# Patient Record
Sex: MALE | Race: WHITE | Employment: OTHER | URBAN - NONMETROPOLITAN AREA
[De-identification: names, ages, dates, MRNs, and addresses within clinical notes are randomized per-mention and may not be internally consistent; named-entity substitution may affect disease eponyms.]

---

## 2017-12-19 ENCOUNTER — OFFICE VISIT (OUTPATIENT)
Dept: INTERNAL MEDICINE CLINIC | Age: 67
End: 2017-12-19
Payer: MEDICARE

## 2017-12-19 VITALS
HEIGHT: 72 IN | SYSTOLIC BLOOD PRESSURE: 136 MMHG | DIASTOLIC BLOOD PRESSURE: 70 MMHG | BODY MASS INDEX: 31.97 KG/M2 | HEART RATE: 60 BPM | WEIGHT: 236 LBS

## 2017-12-19 DIAGNOSIS — Z01.818 PREOP EXAMINATION: Primary | ICD-10-CM

## 2017-12-19 DIAGNOSIS — I10 ESSENTIAL HYPERTENSION: ICD-10-CM

## 2017-12-19 DIAGNOSIS — D69.6 THROMBOCYTOPENIA (HCC): ICD-10-CM

## 2017-12-19 DIAGNOSIS — E78.5 HYPERLIPIDEMIA, UNSPECIFIED HYPERLIPIDEMIA TYPE: ICD-10-CM

## 2017-12-19 PROCEDURE — 1036F TOBACCO NON-USER: CPT | Performed by: INTERNAL MEDICINE

## 2017-12-19 PROCEDURE — 99214 OFFICE O/P EST MOD 30 MIN: CPT | Performed by: INTERNAL MEDICINE

## 2017-12-19 PROCEDURE — 4040F PNEUMOC VAC/ADMIN/RCVD: CPT | Performed by: INTERNAL MEDICINE

## 2017-12-19 PROCEDURE — G8417 CALC BMI ABV UP PARAM F/U: HCPCS | Performed by: INTERNAL MEDICINE

## 2017-12-19 PROCEDURE — 3017F COLORECTAL CA SCREEN DOC REV: CPT | Performed by: INTERNAL MEDICINE

## 2017-12-19 PROCEDURE — G8427 DOCREV CUR MEDS BY ELIG CLIN: HCPCS | Performed by: INTERNAL MEDICINE

## 2017-12-19 PROCEDURE — G8484 FLU IMMUNIZE NO ADMIN: HCPCS | Performed by: INTERNAL MEDICINE

## 2017-12-19 PROCEDURE — 1123F ACP DISCUSS/DSCN MKR DOCD: CPT | Performed by: INTERNAL MEDICINE

## 2017-12-19 NOTE — PROGRESS NOTES
[facility]  PHYSICIANS [place] [address], [address]  Dept: [phone]  Dept Fax: [phone]      Chief Complaint   Patient presents with    Pre-op Exam     This patient was referred to me by Dr. Shaquille Phillips for pre-op evaluation prior to right THR which is scheduled for 1/18 at [facility]. Patient has had problems with the hip for 8 years  It is causing severe pain and problems with mobility and ambulation  Patient has failed conservative management. Please see documentation per orthopaedics  I saw him November 2016 prior to left hip replacement. At that time he had an abnormal EKG  Dr. Ani Fairbanks did a stress test that was normal.  Patient says nothing has changed since I saw him last  Past Medical History:   Diagnosis Date    Hyperlipidemia     Hypertension        Past Surgical History:   Procedure Laterality Date    ACHILLES TENDON SURGERY Right 1985   [address]  [id number]   [place]    Lumpectomy    TOTAL HIP ARTHROPLASTY Right 2016       Social History     Social History    Marital status: Single     Spouse name: N/A    Number of children: N/A    Years of education: N/A     Occupational History    Not on file. Social History Main Topics    Smoking status: Former Smoker     Start date: 12/11/2017    Smokeless tobacco: Former User    Alcohol use Yes    Drug use: No    Sexual activity: Not on file     Other Topics Concern    Not on file     Social History Narrative    No narrative on file       Current Outpatient Prescriptions   Medication Sig Dispense Refill    CALCIUM 500/D 500-400 MG-UNIT CHEW Take 1 tablet by mouth 2 times daily      nitroGLYCERIN (NITROSTAT) 0.4 MG SL tablet Place 0.4 mg under the tongue every 5 minutes as needed for Chest pain Dissolve 1 tablet under tongue for chest pain and repeat every 5 min up to max of 3 total doses.   If no relief after 3 doses call 911      amLODIPine-atorvastatatin (CADUET) 5-40 MG per tablet Take 1 tablet by mouth daily      losartan (COZAAR) 100 MG tablet Take 100 mg by mouth daily      metoprolol succinate (TOPROL XL) 25 MG extended release tablet Take 25 mg by mouth daily       No current facility-administered medications for this visit. Family History   Problem Relation Age of Onset    Arthritis Mother     Cancer Father     Arthritis Father      No Known Allergies  There is no family history of bleeding disorders  Review of Systems - General ROS: negative  Psychological ROS: negative  Hematological and Lymphatic ROS: No history of blood clots or bleeding disorder. Respiratory ROS: no cough, shortness of breath, or wheezing  Cardiovascular ROS: no chest pain or dyspnea on exertion  Gastrointestinal ROS: no abdominal pain, change in bowel habits, or black or bloody stools  Genito-Urinary ROS: no dysuria, trouble voiding, or hematuria  Musculoskeletal ROS: positive for - pain in right hip  Neurological ROS: no TIA or stroke symptoms  Dermatological ROS: negative    This patient has never experienced difficulty with general anesthetic. SLEEP APNEA SCREENING:  Do you snore? yes  Ever been told you stop breathing during sleep? no  Daytime somnolence? no  Neck size n/a inches  BMI 32.01  Blood pressure 136/70, pulse 60, height 6' (1.829 m), weight 236 lb (107 kg). Body mass index is 32.01 kg/m². Physical Examination: General appearance - alert, well appearing, and in no distress  Mental status - alert, oriented to person, place, and time  Neck - Neck is supple, no JVD or carotid bruits. No thyromegaly or adenopathy.    Chest - clear to auscultation, no wheezes, rales or rhonchi, symmetric air entry  Heart - normal rate, regular rhythm, normal S1, S2, no murmurs, rubs, clicks or gallops  Abdomen - soft, nontender, nondistended, no masses or organomegaly  Neurological - alert, oriented, normal speech, no focal findings or movement disorder noted  Musculoskeletal - no joint tenderness, deformity or swelling  Extremities - peripheral pulses normal, no pedal edema, no clubbing or cyanosis  Skin - normal coloration and turgor, no rashes, no suspicious skin lesions noted    I have reviewed recent diagnostic testing including EKG, CXR, labs    1. Preop examination     2. Essential hypertension     3. Hyperlipidemia, unspecified hyperlipidemia type     4. Thrombocytopenia ([facility] [address]                 Patient has no h/o MI or CHF  Patient is active to > 4 mets without any cardiac symptoms. Planned surgery is  intermediate risk. Recent EKG showed sinus bradycardia. Chest x-ray nothing acute. Labs essentially normal aside from borderline thrombocytopenia 143,000     No further testing needed. There are no contraindications to proceed with surgery. Acceptable risk from a cardiopulmonary/medical standpoint. Will notify referring surgeon. Recommend routine DVT/PE prophylaxis as per surgery.

## 2017-12-29 ENCOUNTER — HOSPITAL ENCOUNTER (OUTPATIENT)
Dept: RADIATION ONCOLOGY | Age: 67
Discharge: HOME | End: 2017-12-29
Payer: MEDICARE

## 2017-12-29 PROCEDURE — 99202 OFFICE O/P NEW SF 15 MIN: CPT | Performed by: RADIOLOGY

## 2018-01-08 ENCOUNTER — HOSPITAL ENCOUNTER (OUTPATIENT)
Dept: RADIATION ONCOLOGY | Age: 68
Discharge: HOME | End: 2018-01-08
Payer: MEDICARE

## 2018-01-08 ENCOUNTER — HOSPITAL ENCOUNTER (OUTPATIENT)
Age: 68
Discharge: HOME | End: 2018-01-08
Payer: MEDICARE

## 2018-01-08 LAB
ABO: NORMAL
ANTIBODY SCREEN: NORMAL
RH FACTOR: NORMAL

## 2018-01-08 PROCEDURE — 86900 BLOOD TYPING SEROLOGIC ABO: CPT

## 2018-01-08 PROCEDURE — 86901 BLOOD TYPING SEROLOGIC RH(D): CPT

## 2018-01-08 PROCEDURE — 86850 RBC ANTIBODY SCREEN: CPT

## 2018-01-08 PROCEDURE — 36415 COLL VENOUS BLD VENIPUNCTURE: CPT

## 2018-01-09 ENCOUNTER — HOSPITAL ENCOUNTER (OUTPATIENT)
Dept: CT IMAGING | Age: 68
Discharge: HOME | End: 2018-01-09
Payer: MEDICARE

## 2018-01-09 DIAGNOSIS — M89.8X9 HETEROTOPIC OSSIFICATION OF BONE: ICD-10-CM

## 2018-01-09 PROCEDURE — 77412 RADIATION TX DELIVERY LVL 3: CPT | Performed by: RADIOLOGY

## 2018-01-09 PROCEDURE — 77290 THER RAD SIMULAJ FIELD CPLX: CPT | Performed by: RADIOLOGY

## 2018-01-09 PROCEDURE — 77334 RADIATION TREATMENT AID(S): CPT | Performed by: RADIOLOGY

## 2018-01-09 PROCEDURE — 3209999900 CT GUIDE RADIATION THERAPY NO CHARGE

## 2018-01-09 PROCEDURE — 77280 THER RAD SIMULAJ FIELD SMPL: CPT | Performed by: RADIOLOGY

## 2018-01-09 PROCEDURE — 77307 TELETHX ISODOSE PLAN CPLX: CPT | Performed by: RADIOLOGY

## 2018-01-09 NOTE — PROGRESS NOTES
Radiation/ Oncology Consultation  Encounter Date: 2017     Mr. Bharati Emerson is a 75 y.o. male  : 1950  MRN: [id number]  [facility]  Requesting Provider: Dr. Tammy Hidalgo    Reason for request: Heterotopic Ossification      CONSULTANT: Rosemary Peralta       CHIEF COMPLAINT: M61.40 -- Post-operative Heterotopic Ossification       HPI: Bharati Emerson is a 28-year-old gentleman who has severe degenerative joint disease, particularly involving the hips. He underwent left total hip arthroplasty in 2016, and developed heterotopic ossification after the surgery. He maintains fairly good range of motion of the left hip, but given the history he is at very high risk of developing heterotopic ossification again if he has additional surgeries. He is now scheduled to undergo right total hip arthroplasty. He is being seen today for evaluation and discussion regarding the role of radiation therapy and significantly decreasing his risk of heterotopic ossification after his upcoming surgery. .         Past Medical History:   Diagnosis Date    Hyperlipidemia     Hypertension         Past Surgical History:   Procedure Laterality Date    ACHILLES TENDON SURGERY Right     CARDIAC CATHETERIZATION  [id number]    NECK SURGERY      Lumpectomy    TOTAL HIP ARTHROPLASTY Right 2016   CORRECTION: LEFT TOTAL HIP ARTHROPLASTY, 2016    Family History   Problem Relation Age of Onset    Arthritis Mother     Cancer Father     Arthritis Father    Additional family medical conditions include hypertension and heart disease. Social History     Social History    Marital status:      Spouse name: N/A    Number of children: N/A    Years of education: N/A     Occupational History    Not on file.      Social History Main Topics    Smoking status: Former Smoker     Start date: 2017    Smokeless tobacco: Former User    Alcohol use Yes    Drug use: No    Sexual activity: Not on heterotopic bone prevention. Clinical data show a success rate of approximately 96%. Data also showed that delivering a single fraction preoperatively has a success rate similar to the previous standard postoperative therapy. Preoperative treatment is much better tolerated, and is now considered the preferred method. 3. The diagnosis and treatment recommendations were discussed at length with the patient. We then discussed the nature/mode of action of external beam radiation therapy, steps involved and set up and initiation of treatment, and expected plus potential short and long-term side effects and risks of the treatment. Given the low total dose, there are no acute side effects from the treatment and data show extremely low risk of long-term side effects when treatment is designed and deliver properly. Mr. Latia Echavarria had the opportunity to ask questions, and indicated his questions were satisfactorily addressed. He also indicated that he understood the discussion, and wishes to proceed with the recommended treatment. PLAN:  1. Surgery is planned for 1/10/2018. The current least scheduled procedure requires that the patient reports 20IO early in the morning. Therefore, we will schedule simulation and treatment for the day preceding surgery. Treatment will be in the afternoon to have the shortest interval possible between the treatment and the surgery (well within the 24 hour window). Thank you for allowing my assistance in the care of this very nice, and interesting gentleman. Electronically signed by Alena Garcia MD on 2018 at 9:36 AM       cc:  Dr. Luisana Haque; Dr. Po Patel: 70 minutes spent actively reviewing patient data; 50 minutes face-to-face time,  >50% spent in counseling/discussion/education. This document was created using a voice-recognition program.  Computer generated transcription errors may be present.

## 2018-01-25 ENCOUNTER — HOSPITAL ENCOUNTER (OUTPATIENT)
Dept: INTERVENTIONAL RADIOLOGY/VASCULAR | Age: 68
Discharge: HOME | End: 2018-01-25
Payer: MEDICARE

## 2018-01-25 DIAGNOSIS — R60.9 SWELLING: ICD-10-CM

## 2018-01-25 DIAGNOSIS — R52 PAIN: ICD-10-CM

## 2018-01-25 PROCEDURE — 93971 EXTREMITY STUDY: CPT

## 2018-02-23 ENCOUNTER — HOSPITAL ENCOUNTER (OUTPATIENT)
Dept: RADIATION ONCOLOGY | Age: 68
Discharge: HOME | End: 2018-02-23
Payer: MEDICARE

## 2018-02-23 PROCEDURE — 99211 OFF/OP EST MAY X REQ PHY/QHP: CPT

## 2018-02-23 NOTE — PROGRESS NOTES
[facility], [address]  Phone: [phone]   Toll Free: [phone]   Fax: [phone]    RADIATION ONCOLOGY FOLLOW UP REPORT    PATIENT NAME:  Mercedes Mcpherson     : 1950  MEDICAL RECORD NO: [id number]    LOCATION: [facility] NO: [id number]      PROVIDER: Joce Cardona CNP        DATE OF SERVICE: 2018    FOLLOW UP PHYSICIANS: Dr. Benji Gaytan    DIAGNOSIS: M61.40 -- Post-operative Heterotopic Ossification     DATE OF DIAGNOSIS:  with left hip replacement    END OF TREATMENT DATE: 2018    ECOG PERFORMANCE STATUS: 0    PAIN: 3/10 right hip    CHAPERONE: Declined      HPI: Demetrius Middleton is a 24-year-old gentleman who has severe degenerative joint disease, particularly involving the hips. He underwent left total hip arthroplasty in , and developed heterotopic ossification after the surgery. He maintains fairly good range of motion of the left hip, but given the history he is at very high risk of developing heterotopic ossification again if he has additional surgeries. He is now scheduled to undergo right total hip arthroplasty. He was seen in radiation oncology for evaluation and discussion regarding the role of radiation therapy and significantly decreasing his risk of heterotopic ossification after undergoing right hip replacement surgery. Demetrius Middleton was agreeable to having radiation prior to surgery. He did not have any unexpected side effects from the radiation treatment. INTERVAL HISTORY: Demetrius iMddleton returns to [facility] for a post treatment check up after undergoing radiation prior to right hip replacement surgery. Demetrius Middleton states he has been doing well after surgery. He has been working with physical therapy twice per week at [facility]. He states they work him \"hard\" and does have sore muscles after doing therapy. He is also is complying with home exercise program given to him.  He

## 2021-05-12 ENCOUNTER — HOSPITAL ENCOUNTER (EMERGENCY)
Age: 71
Discharge: HOME | End: 2021-05-12
Payer: MEDICARE

## 2021-05-12 ENCOUNTER — APPOINTMENT (OUTPATIENT)
Dept: GENERAL RADIOLOGY | Age: 71
End: 2021-05-12
Payer: MEDICARE

## 2021-05-12 VITALS
SYSTOLIC BLOOD PRESSURE: 137 MMHG | WEIGHT: 225 LBS | TEMPERATURE: 97.6 F | BODY MASS INDEX: 30.52 KG/M2 | OXYGEN SATURATION: 98 % | DIASTOLIC BLOOD PRESSURE: 60 MMHG | HEART RATE: 62 BPM | RESPIRATION RATE: 18 BRPM

## 2021-05-12 DIAGNOSIS — S46.912A STRAIN OF LEFT SHOULDER, INITIAL ENCOUNTER: Primary | ICD-10-CM

## 2021-05-12 DIAGNOSIS — S43.005A SHOULDER SEPARATION, LEFT, INITIAL ENCOUNTER: ICD-10-CM

## 2021-05-12 PROCEDURE — 73030 X-RAY EXAM OF SHOULDER: CPT

## 2021-05-12 PROCEDURE — 99282 EMERGENCY DEPT VISIT SF MDM: CPT

## 2021-05-12 RX ORDER — ETODOLAC 400 MG/1
400 TABLET, FILM COATED ORAL 2 TIMES DAILY
Qty: 60 TABLET | Refills: 3 | Status: SHIPPED | OUTPATIENT
Start: 2021-05-12 | End: 2022-10-07

## 2021-05-12 ASSESSMENT — PAIN DESCRIPTION - LOCATION: LOCATION: SHOULDER

## 2021-05-12 ASSESSMENT — PAIN DESCRIPTION - ORIENTATION: ORIENTATION: LEFT

## 2021-05-12 ASSESSMENT — PAIN DESCRIPTION - PAIN TYPE: TYPE: ACUTE PAIN

## 2021-05-12 NOTE — ED NOTES
Pt tripped over hole in tile floor on Monday, landing on left shoulder. PT states some pain in tailbone after fall as well. Denies hitting head. Denies positive LOC. Denies blood thinners.      Minal Torre RN  05/12/21 9521

## 2021-05-12 NOTE — ED PROVIDER NOTES
[facility] COMPLAINT       Chief Complaint   Patient presents with    Fall     monday    Shoulder Pain     left       Nurses Notes reviewed and I agree except as noted in the HPI. HISTORY OF PRESENT ILLNESS    Leonor Nicole is a 66 y.o. male who presents to the Emergency Department for the evaluation of left shoulder pain since fall on Monday. Patient tripped and fell forward, landed on outstretched left arm, states that he developed immediate left shoulder pain. His wife noted a deformity to his left shoulder that she describes as a gap with a lump. Reports that there is a significant deformity yesterday which has significantly improved today. Denies previous shoulder surgeries. Allergies. The HPI was provided by the patient. REVIEW OF SYSTEMS     Review of Systems   Constitutional: Negative for chills and fever. Gastrointestinal: Negative for nausea and vomiting. Musculoskeletal: Positive for arthralgias (left shboulder), joint swelling and myalgias. Skin: Negative for color change and wound. Neurological: Negative for dizziness, light-headedness, numbness and headaches. PAST MEDICAL HISTORY    has a past medical history of Hyperlipidemia and Hypertension. SURGICAL HISTORY      has a past surgical history that includes Achilles tendon surgery (Right, 1985); Neck surgery (1999); Cardiac catheterization ([id number]); and Total hip arthroplasty (Right, 2016). CURRENT MEDICATIONS       Discharge Medication List as of 5/12/2021 11:02 AM      CONTINUE these medications which have NOT CHANGED    Details   CALCIUM 500/D 500-400 MG-UNIT CHEW Take 1 tablet by mouth 2 times daily, EARLE      nitroGLYCERIN (NITROSTAT) 0.4 MG SL tablet Place 0.4 mg under the tongue every 5 minutes as needed for Chest pain Dissolve 1 tablet under tongue for chest pain and repeat every 5 min up to max of 3 total doses.   If no relief after 3 doses call 911      amLODIPine-atorvastatatin (CADUET) 5-40 MG per tablet Take 1 tablet by mouth daily      losartan (COZAAR) 100 MG tablet Take 100 mg by mouth daily      metoprolol succinate (TOPROL XL) 25 MG extended release tablet Take 25 mg by mouth daily             ALLERGIES     has No Known Allergies. FAMILY HISTORY     He indicated that the status of his mother is unknown. He indicated that the status of his father is unknown.   family history includes Arthritis in his father and mother; Cancer in his father. SOCIAL HISTORY      reports that he has quit smoking. He started smoking about 3 years ago. He has quit using smokeless tobacco. He reports current alcohol use. He reports that he does not use drugs. PHYSICAL EXAM     INITIAL VITALS:  weight is 225 lb (102.1 kg). His oral temperature is 97.6 °F (36.4 °C). His blood pressure is 137/60 and his pulse is 62. His respiration is 18 and oxygen saturation is 98%. Physical Exam  Vitals signs and nursing note reviewed. Constitutional:       General: He is awake. He is not in acute distress. Appearance: Normal appearance. He is well-developed and overweight. He is not ill-appearing, toxic-appearing or diaphoretic. HENT:      Head: Normocephalic and atraumatic. Nose: Nose normal.      Mouth/Throat:      Mouth: Mucous membranes are moist.      Pharynx: Oropharynx is clear. Eyes:      Extraocular Movements: Extraocular movements intact. Pupils: Pupils are equal, round, and reactive to light. Neck:      Musculoskeletal: Normal range of motion and neck supple. No neck rigidity or muscular tenderness. Cardiovascular:      Rate and Rhythm: Normal rate and regular rhythm. No extrasystoles are present. Pulses: Normal pulses. Heart sounds: Normal heart sounds, S1 normal and S2 normal. Heart sounds not distant. No murmur. No friction rub. No gallop.     Pulmonary:      Effort: Pulmonary effort is normal. No tachypnea, bradypnea, accessory muscle usage, prolonged expiration, respiratory distress or retractions. Breath sounds: Normal breath sounds. No stridor. No wheezing, rhonchi or rales. Chest:      Chest wall: No tenderness. Abdominal:      General: Abdomen is flat. Bowel sounds are normal. There is no distension. Palpations: Abdomen is soft. There is no shifting dullness, hepatomegaly, splenomegaly or mass. Tenderness: There is no abdominal tenderness. Hernia: No hernia is present. Musculoskeletal:         General: No swelling or signs of injury. Left shoulder: He exhibits decreased range of motion, tenderness, bony tenderness, deformity, pain and decreased strength. He exhibits no swelling. Right lower leg: No edema. Left lower leg: No edema. Skin:     General: Skin is warm and dry. Capillary Refill: Capillary refill takes less than 2 seconds. Coloration: Skin is not jaundiced or pale. Neurological:      General: No focal deficit present. Mental Status: He is alert and oriented to person, place, and time. Mental status is at baseline. GCS: GCS eye subscore is 4. GCS verbal subscore is 5. GCS motor subscore is 6. Cranial Nerves: Cranial nerves are intact. Sensory: Sensation is intact. Psychiatric:         Mood and Affect: Mood normal.         Behavior: Behavior normal. Behavior is cooperative. DIFFERENTIAL DIAGNOSIS:   Shoulder fracture, clavicle fracture, shoulder separation, humeral head fracture, dislocation    DIAGNOSTIC RESULTS     EKG: All EKG's are interpreted by the Emergency Department Physician who either signs or Co-signs this chart in the absence of a cardiologist.    None    RADIOLOGY: non-plainfilm images(s) such as CT, Ultrasound and MRI are read by the radiologist.    XR SHOULDER LEFT (MIN 2 VIEWS)   Final Result   1. Well-corticated ossific density adjacent to the acromion process, possibly secondary to prior fracture.    2. Separation of the acromioclavicular joint. Final report electronically signed by Dr. Hiren Ayon on 5/12/2021 10:18 AM          LABS:     Labs Reviewed - No data to display    EMERGENCY DEPARTMENT COURSE:   Vitals:    Vitals:    05/12/21 0951   BP: 137/60   Pulse: 62   Resp: 18   Temp: 97.6 °F (36.4 °C)   TempSrc: Oral   SpO2: 98%   Weight: 225 lb (102.1 kg)       10:31 AM EDT: The patient was seen and evaluated. MDM:  Patient seen and evaluated today for left shoulder pain after mechanical fall. Complaining of left shoulder pain. X-rays were ordered showing AC joint separation. Patient will be placed in sling with appropriate analgesia. He reports that appearance of lump in shoulder has significantly improved since yesterday. Recommended total rest of left arm for at least 2 weeks and recommended follow-up with orthopedics. Patient and wife were amenable to this plan, all questions were answered and he departed the ED in stable condition. CRITICAL CARE:   None    CONSULTS:  None    PROCEDURES:  None    FINAL IMPRESSION      1. Strain of left shoulder, initial encounter    2. Shoulder separation, left, initial encounter          DISPOSITION/PLAN   Discharge    PATIENT REFERRED TO:  BennieShelby Lupeyasmeendeidre [address] [place] [phone]  Go to   As Scheduled      DISCHARGE MEDICATIONS:  Discharge Medication List as of 5/12/2021 11:02 AM      START taking these medications    Details   etodolac (LODINE) 400 MG tablet Take 1 tablet by mouth 2 times daily, Disp-60 tablet, R-3Print             (Please note that portions of this note were completed with a voice recognition program.  Efforts were made to edit the dictations but occasionally words are mis-transcribed.)    The patient was given an opportunity to see the Emergency Attending. The patient voiced understanding that I was a Mid-LevelProvider and was in agreement with being seen independently by myself. Provider:  I personally performed the services described in the documentation, reviewed and edited the documentation which was dictated to the scribe in my presence, and it accurately records my words and actions.     AKANKSHA Zaidi CNP, 5/12/21, 3:55 PM       AKANKSHA Zaidi CNP  05/14/21 5983

## 2021-05-14 ASSESSMENT — ENCOUNTER SYMPTOMS
NAUSEA: 0
VOMITING: 0
COLOR CHANGE: 0

## 2022-04-07 DIAGNOSIS — R60.0 PEDAL EDEMA: ICD-10-CM

## 2022-04-07 DIAGNOSIS — R07.89 ATYPICAL CHEST PAIN: ICD-10-CM

## 2022-05-11 PROBLEM — E66.9 OBESITY: Status: ACTIVE | Noted: 2022-05-11

## 2022-05-11 PROBLEM — R07.89 ATYPICAL CHEST PAIN: Status: ACTIVE | Noted: 2022-05-11

## 2022-05-11 PROBLEM — I25.10 CAD (CORONARY ARTERY DISEASE): Status: ACTIVE | Noted: 2022-05-11

## 2022-05-11 PROBLEM — R60.0 PEDAL EDEMA: Status: ACTIVE | Noted: 2022-05-11

## 2022-05-11 RX ORDER — ASPIRIN 81 MG/1
81 TABLET ORAL DAILY
COMMUNITY
End: 2022-10-07

## 2022-05-11 RX ORDER — AMLODIPINE BESYLATE 5 MG/1
5 TABLET ORAL DAILY
COMMUNITY
End: 2022-10-07

## 2022-05-11 RX ORDER — ATORVASTATIN CALCIUM 40 MG/1
40 TABLET, FILM COATED ORAL DAILY
COMMUNITY

## 2022-09-22 ENCOUNTER — HOSPITAL ENCOUNTER (OUTPATIENT)
Dept: NON INVASIVE DIAGNOSTICS | Age: 72
Discharge: HOME | End: 2022-09-22
Payer: MEDICARE

## 2022-09-22 DIAGNOSIS — R22.43 LOCALIZED SWELLING, MASS, OR LUMP OF LOWER EXTREMITY, BILATERAL: ICD-10-CM

## 2022-09-22 LAB
LV EF: 55 %
LVEF MODALITY: NORMAL

## 2022-09-22 PROCEDURE — 93306 TTE W/DOPPLER COMPLETE: CPT

## 2022-10-07 ENCOUNTER — OFFICE VISIT (OUTPATIENT)
Dept: CARDIOLOGY CLINIC | Age: 72
End: 2022-10-07
Payer: MEDICARE

## 2022-10-07 VITALS
BODY MASS INDEX: 33.4 KG/M2 | HEART RATE: 53 BPM | HEIGHT: 72 IN | SYSTOLIC BLOOD PRESSURE: 144 MMHG | WEIGHT: 246.6 LBS | DIASTOLIC BLOOD PRESSURE: 70 MMHG | RESPIRATION RATE: 20 BRPM

## 2022-10-07 DIAGNOSIS — E78.5 DYSLIPIDEMIA (HIGH LDL; LOW HDL): ICD-10-CM

## 2022-10-07 DIAGNOSIS — I10 PRIMARY HYPERTENSION: Primary | ICD-10-CM

## 2022-10-07 PROCEDURE — 99214 OFFICE O/P EST MOD 30 MIN: CPT | Performed by: INTERNAL MEDICINE

## 2022-10-07 PROCEDURE — 1123F ACP DISCUSS/DSCN MKR DOCD: CPT | Performed by: INTERNAL MEDICINE

## 2022-10-07 PROCEDURE — 3017F COLORECTAL CA SCREEN DOC REV: CPT | Performed by: INTERNAL MEDICINE

## 2022-10-07 PROCEDURE — 1036F TOBACCO NON-USER: CPT | Performed by: INTERNAL MEDICINE

## 2022-10-07 PROCEDURE — G8417 CALC BMI ABV UP PARAM F/U: HCPCS | Performed by: INTERNAL MEDICINE

## 2022-10-07 PROCEDURE — G8427 DOCREV CUR MEDS BY ELIG CLIN: HCPCS | Performed by: INTERNAL MEDICINE

## 2022-10-07 PROCEDURE — G8484 FLU IMMUNIZE NO ADMIN: HCPCS | Performed by: INTERNAL MEDICINE

## 2022-10-07 PROCEDURE — 93000 ELECTROCARDIOGRAM COMPLETE: CPT | Performed by: INTERNAL MEDICINE

## 2022-10-07 RX ORDER — POTASSIUM CHLORIDE 750 MG/1
TABLET, FILM COATED, EXTENDED RELEASE ORAL
COMMUNITY
Start: 2022-08-29 | End: 2022-10-07 | Stop reason: SDUPTHER

## 2022-10-07 RX ORDER — BUMETANIDE 2 MG/1
TABLET ORAL
COMMUNITY
Start: 2022-08-29 | End: 2022-10-07 | Stop reason: SDUPTHER

## 2022-10-07 RX ORDER — BUMETANIDE 2 MG/1
TABLET ORAL
Qty: 90 TABLET | Refills: 3 | Status: SHIPPED | OUTPATIENT
Start: 2022-10-07

## 2022-10-07 RX ORDER — POTASSIUM CHLORIDE 750 MG/1
TABLET, FILM COATED, EXTENDED RELEASE ORAL
Qty: 90 TABLET | Refills: 3 | Status: SHIPPED | OUTPATIENT
Start: 2022-10-07

## 2022-10-07 ASSESSMENT — ENCOUNTER SYMPTOMS
CHOKING: 0
TROUBLE SWALLOWING: 0
VOICE CHANGE: 0
CHEST TIGHTNESS: 0
COUGH: 0
NAUSEA: 0
VOMITING: 0
BLOOD IN STOOL: 0
COLOR CHANGE: 0
SHORTNESS OF BREATH: 0
STRIDOR: 0
ABDOMINAL PAIN: 0
WHEEZING: 0
ANAL BLEEDING: 0
ABDOMINAL DISTENTION: 0
APNEA: 0

## 2022-10-07 NOTE — PROGRESS NOTES
[address] [address]  Dept: [facility]  Loc: [phone]     10/7/2022       Ryan Heck is here today for   Chief Complaint   Patient presents with    Follow-up           Referring Physician:  No ref. provider found     Patient Active Problem List   Diagnosis    Hypertension    Hyperlipidemia    CAD (coronary artery disease)    Obesity    Atypical chest pain    Pedal edema       Review of Systems   Constitutional:  Negative for activity change, appetite change, fatigue, fever and unexpected weight change. HENT:  Negative for congestion, trouble swallowing and voice change. Eyes:  Negative for visual disturbance. Respiratory:  Negative for apnea, cough, choking, chest tightness, shortness of breath, wheezing and stridor. Cardiovascular:  Positive for leg swelling. Negative for chest pain and palpitations. Gastrointestinal:  Negative for abdominal distention, abdominal pain, anal bleeding, blood in stool, nausea and vomiting. Endocrine: Negative for cold intolerance and heat intolerance. Genitourinary:  Negative for hematuria. Musculoskeletal:  Negative for arthralgias, gait problem, joint swelling and myalgias. Skin:  Negative for color change and rash. Allergic/Immunologic: Negative for environmental allergies and food allergies. Neurological:  Negative for dizziness, tremors, syncope, facial asymmetry, weakness, light-headedness, numbness and headaches. Hematological:  Does not bruise/bleed easily. Psychiatric/Behavioral:  Negative for agitation, behavioral problems and sleep disturbance.        Past Medical History:   Diagnosis Date    CAD (coronary artery disease)     Hyperlipidemia     Hypertension     Obesity        No Known Allergies    Current Outpatient Medications   Medication Sig Dispense Refill    potassium chloride (KLOR-CON) 10 MEQ extended release tablet TAKE 1 TABLET BY MOUTH EVERY DAY WITH FOOD AS NEEDED 90 tablet 3    bumetanide (BUMEX) 2 MG tablet TAKE 1 TABLET BY MOUTH EVERY DAY IN THE MORNING 90 tablet 3    atorvastatin (LIPITOR) 40 MG tablet Take 40 mg by mouth daily      CALCIUM 500/D 500-400 MG-UNIT CHEW Take 1 tablet by mouth 2 times daily      nitroGLYCERIN (NITROSTAT) 0.4 MG SL tablet Place 0.4 mg under the tongue every 5 minutes as needed for Chest pain Dissolve 1 tablet under tongue for chest pain and repeat every 5 min up to max of 3 total doses. If no relief after 3 doses call 911      losartan (COZAAR) 100 MG tablet Take 100 mg by mouth daily      metoprolol succinate (TOPROL XL) 25 MG extended release tablet Take 25 mg by mouth daily       No current facility-administered medications for this visit. Social History     Socioeconomic History    Marital status:      Spouse name: None    Number of children: None    Years of education: None    Highest education level: None   Tobacco Use    Smoking status: Former     Types: Cigarettes     Start date: 12/11/2017    Smokeless tobacco: Former   Substance and Sexual Activity    Alcohol use: Yes    Drug use: No       Family History   Problem Relation Age of Onset    Arthritis Mother     Cancer Father     Arthritis Father        Blood pressure (!) 144/70, pulse 53, resp. rate 20, height 6' (1.829 m), weight 246 lb 9.6 oz (111.9 kg).     Physical Exam:    General Appearance: alert and oriented to person, place and time, in no acute distress  Cardiovascular: normal rate, regular rhythm, normal S1 and S2, no murmurs, rubs, clicks, or gallops, distal pulses intact, no carotid bruits, no JVD  Pulmonary/Chest: clear to auscultation bilaterally- no wheezes, rales or rhonchi, normal air movement, no respiratory distress  Abdomen: soft, non-tender, non-distended, normal bowel sounds, no masses   Extremities: no cyanosis, clubbing or edema, pulse   Skin: warm and dry, no rash or erythema  Head: normocephalic and atraumatic  Eyes: pupils equal, round, and reactive to light  Neck: supple and non-tender without mass, no thyromegaly   Musculoskeletal: normal range of motion, no joint swelling, deformity or tenderness  Neurological: alert, oriented, normal speech, no focal findings or movement disorder noted    Lab Data:    Cardiac Enzymes:  No results for input(s): CKTOTAL, CKMB, CKMBINDEX, TROPONINI in the last 72 hours. CBC:   No results found for: WBC, RBC, HGB, HCT, PLT    CMP:  No results found for: NA, K, CL, CO2, BUN, CREATININE, GFRAA, AGRATIO, LABGLOM, GLUCOSE, GLU, CALCIUM    Hepatic Function Panel:  No results found for: ALKPHOS, ALT, AST, PROT, BILITOT, BILIDIR, IBILI, LABALBU    Magnesium:  No results found for: MG    PT/INR:  No results found for: PROTIME, INR    HgBA1c:  No results found for: LABA1C    FLP:  No results found for: TRIG, HDL, LDLCALC, LDLDIRECT, LABVLDL    TSH:  No results found for: TSH     Diagnosis Orders   1. Primary hypertension  34058 - [place] ELECTROCARDIOGRAM, COMPLETE    CBC    Basic Metabolic Panel    Lipid Panel    Hepatic Function Panel      2. Dyslipidemia (high LDL; low HDL)  CBC    Basic Metabolic Panel    Lipid Panel    Hepatic Function Panel           Assessment/Plan    This patient 67years old gentleman who was seen here at his request.    This patient complaining of bilateral pedal edema. He is somewhat overweight. His BMI is 33.44. The patient complain of the pedal edema has been on the Bumex his BUN of 50 and a blood sugar of he is taken his Bumex or not. The patient has a BNP which was abnormal.  The patient has an echocardiogram showed preserved systolic function of the left ventricle. The that was felt the patient probably has lymphedema and I will refer him to the vein clinic not sure this is a sign of congestive heart failure.   The patient will continue the rest of medication he will be seen annually he will have the lab work done he is to seek medical attention for any change in clinical condition. Orders Placed This Encounter   Procedures    CBC     Standing Status:   Future     Standing Expiration Date:   33/3/6512    Basic Metabolic Panel     Standing Status:   Future     Standing Expiration Date:   10/7/2023    Lipid Panel     Standing Status:   Future     Standing Expiration Date:   10/7/2023     Order Specific Question:   Is Patient Fasting?/# of Hours     Answer:   12 hours    Hepatic Function Panel     Standing Status:   Future     Standing Expiration Date:   10/7/2023    20191 - [place] ELECTROCARDIOGRAM, COMPLETE       Return in about 1 year (around 10/7/2023) for htn.      Maxim Henson MD

## 2023-11-09 ENCOUNTER — OFFICE VISIT (OUTPATIENT)
Dept: CARDIOLOGY CLINIC | Age: 73
End: 2023-11-09
Payer: MEDICARE

## 2023-11-09 VITALS
HEART RATE: 51 BPM | BODY MASS INDEX: 33.32 KG/M2 | RESPIRATION RATE: 22 BRPM | HEIGHT: 72 IN | WEIGHT: 246 LBS | DIASTOLIC BLOOD PRESSURE: 64 MMHG | SYSTOLIC BLOOD PRESSURE: 160 MMHG

## 2023-11-09 DIAGNOSIS — I10 PRIMARY HYPERTENSION: Primary | ICD-10-CM

## 2023-11-09 DIAGNOSIS — E78.5 DYSLIPIDEMIA (HIGH LDL; LOW HDL): ICD-10-CM

## 2023-11-09 PROCEDURE — 93000 ELECTROCARDIOGRAM COMPLETE: CPT | Performed by: INTERNAL MEDICINE

## 2023-11-09 PROCEDURE — G8484 FLU IMMUNIZE NO ADMIN: HCPCS | Performed by: INTERNAL MEDICINE

## 2023-11-09 PROCEDURE — 3017F COLORECTAL CA SCREEN DOC REV: CPT | Performed by: INTERNAL MEDICINE

## 2023-11-09 PROCEDURE — G8427 DOCREV CUR MEDS BY ELIG CLIN: HCPCS | Performed by: INTERNAL MEDICINE

## 2023-11-09 PROCEDURE — G8417 CALC BMI ABV UP PARAM F/U: HCPCS | Performed by: INTERNAL MEDICINE

## 2023-11-09 PROCEDURE — 3078F DIAST BP <80 MM HG: CPT | Performed by: INTERNAL MEDICINE

## 2023-11-09 PROCEDURE — 3077F SYST BP >= 140 MM HG: CPT | Performed by: INTERNAL MEDICINE

## 2023-11-09 PROCEDURE — 99214 OFFICE O/P EST MOD 30 MIN: CPT | Performed by: INTERNAL MEDICINE

## 2023-11-09 PROCEDURE — 1036F TOBACCO NON-USER: CPT | Performed by: INTERNAL MEDICINE

## 2023-11-09 PROCEDURE — 1123F ACP DISCUSS/DSCN MKR DOCD: CPT | Performed by: INTERNAL MEDICINE

## 2023-11-09 RX ORDER — BUMETANIDE 2 MG/1
TABLET ORAL
Qty: 90 TABLET | Refills: 3 | Status: SHIPPED | OUTPATIENT
Start: 2023-11-09

## 2023-11-09 RX ORDER — POTASSIUM CHLORIDE 750 MG/1
TABLET, FILM COATED, EXTENDED RELEASE ORAL
Qty: 90 TABLET | Refills: 3 | Status: SHIPPED | OUTPATIENT
Start: 2023-11-09

## 2023-11-09 ASSESSMENT — ENCOUNTER SYMPTOMS
STRIDOR: 0
VOMITING: 0
SHORTNESS OF BREATH: 1
NAUSEA: 0
COUGH: 0
ABDOMINAL DISTENTION: 0
TROUBLE SWALLOWING: 0
CHOKING: 0
ANAL BLEEDING: 0
WHEEZING: 0
ABDOMINAL PAIN: 0
CHEST TIGHTNESS: 0
COLOR CHANGE: 0
APNEA: 0
BLOOD IN STOOL: 0
VOICE CHANGE: 0

## 2023-11-09 NOTE — PROGRESS NOTES
rash or erythema  Head: normocephalic and atraumatic  Eyes: pupils equal, round, and reactive to light  Neck: supple and non-tender without mass, no thyromegaly   Musculoskeletal: normal range of motion, no joint swelling, deformity or tenderness  Neurological: alert, oriented, normal speech, no focal findings or movement disorder noted    Lab Data:    Cardiac Enzymes:  No results for input(s): \"CKTOTAL\", \"CKMB\", \"CKMBINDEX\", \"TROPONINI\" in the last 72 hours. CBC:   No results found for: \"WBC\", \"RBC\", \"HGB\", \"HCT\", \"PLT\"    CMP:  No results found for: \"NA\", \"K\", \"CL\", \"CO2\", \"BUN\", \"CREATININE\", \"GFRAA\", \"AGRATIO\", \"LABGLOM\", \"GLUCOSE\", \"GLU\", \"CALCIUM\"    Hepatic Function Panel:  No results found for: \"ALKPHOS\", \"ALT\", \"AST\", \"PROT\", \"BILITOT\", \"BILIDIR\", \"IBILI\", \"LABALBU\"    Magnesium:  No results found for: \"MG\"    PT/INR:  No results found for: \"PROTIME\", \"INR\"    HgBA1c:  No results found for: \"LABA1C\"    FLP:  No results found for: \"TRIG\", \"HDL\", \"LDLCALC\", \"LDLDIRECT\", \"LABVLDL\"    TSH:  No results found for: \"TSH\"     Diagnosis Orders   1. Primary hypertension  53473 - [place] ELECTROCARDIOGRAM, COMPLETE    CBC    Basic Metabolic Panel    Lipid Panel    Hepatic Function Panel      2. Dyslipidemia (high LDL; low HDL)  CBC    Basic Metabolic Panel    Lipid Panel    Hepatic Function Panel           Assessment/Plan    Jonathan Molina is 75years old gentleman history of nonobstructive coronary artery disease. He has a history of hypertension. Had history of hypercholesterolemia he is morbidly obese he has a history of lymphedema. She is seen in the vein clinic. He he denies chest pain denies palpitation. The patient take the Bumex as needed.     He had lab work his lab finding was discussed with him his EKG finding discussed with him at this point we will continue medical treatment take the Bumex as needed the patient did had an echo last year was ejection fraction was 55 the patient EKG showed no change continue to

## 2024-02-19 ENCOUNTER — TELEPHONE (OUTPATIENT)
Dept: CARDIOLOGY CLINIC | Age: 74
End: 2024-02-19

## 2024-02-19 NOTE — TELEPHONE ENCOUNTER
Brenda called pt on 2/16/24 to r/s appt that was double booked 12:00 on 11/14/24 was [facility] so sent letter. Pt called and LMOM over the weekend  called pt to r/s [facility]

## 2024-11-12 ENCOUNTER — APPOINTMENT (OUTPATIENT)
Dept: GENERAL RADIOLOGY | Age: 74
DRG: 536 | End: 2024-11-12
Payer: MEDICARE

## 2024-11-12 ENCOUNTER — HOSPITAL ENCOUNTER (INPATIENT)
Age: 74
LOS: 8 days | Discharge: NURSING, REHAB OR LONG-TERM CARE | DRG: 536 | End: 2024-11-20
Attending: STUDENT IN AN ORGANIZED HEALTH CARE EDUCATION/TRAINING PROGRAM | Admitting: STUDENT IN AN ORGANIZED HEALTH CARE EDUCATION/TRAINING PROGRAM
Payer: MEDICARE

## 2024-11-12 DIAGNOSIS — M97.02XA PERIPROSTHETIC FRACTURE AROUND INTERNAL PROSTHETIC LEFT HIP JOINT, INITIAL ENCOUNTER (HCC): Primary | ICD-10-CM

## 2024-11-12 DIAGNOSIS — I25.10 CORONARY ARTERY DISEASE INVOLVING NATIVE HEART WITHOUT ANGINA PECTORIS, UNSPECIFIED VESSEL OR LESION TYPE: ICD-10-CM

## 2024-11-12 DIAGNOSIS — R60.0 EDEMA OF RIGHT LOWER EXTREMITY: ICD-10-CM

## 2024-11-12 LAB
ALBUMIN SERPL BCG-MCNC: 4 G/DL (ref 3.5–5.1)
ALP SERPL-CCNC: 114 U/L (ref 38–126)
ALT SERPL W/O P-5'-P-CCNC: 16 U/L (ref 11–66)
ANION GAP SERPL CALC-SCNC: 11 MEQ/L (ref 8–16)
APTT PPP: 26.2 SECONDS (ref 22–38)
AST SERPL-CCNC: 15 U/L (ref 5–40)
BASOPHILS ABSOLUTE: 0 THOU/MM3 (ref 0–0.1)
BASOPHILS NFR BLD AUTO: 0.3 %
BILIRUB SERPL-MCNC: 0.8 MG/DL (ref 0.3–1.2)
BUN SERPL-MCNC: 18 MG/DL (ref 7–22)
CALCIUM SERPL-MCNC: 8.6 MG/DL (ref 8.5–10.5)
CHLORIDE SERPL-SCNC: 106 MEQ/L (ref 98–111)
CO2 SERPL-SCNC: 26 MEQ/L (ref 23–33)
CREAT SERPL-MCNC: 0.8 MG/DL (ref 0.4–1.2)
DEPRECATED RDW RBC AUTO: 58.6 FL (ref 35–45)
EOSINOPHIL NFR BLD AUTO: 0.7 %
EOSINOPHILS ABSOLUTE: 0.1 THOU/MM3 (ref 0–0.4)
ERYTHROCYTE [DISTWIDTH] IN BLOOD BY AUTOMATED COUNT: 15.1 % (ref 11.5–14.5)
GFR SERPL CREATININE-BSD FRML MDRD: > 90 ML/MIN/1.73M2
GLUCOSE SERPL-MCNC: 114 MG/DL (ref 70–108)
HCT VFR BLD AUTO: 37.4 % (ref 42–52)
HGB BLD-MCNC: 13.1 GM/DL (ref 14–18)
IMM GRANULOCYTES # BLD AUTO: 0.03 THOU/MM3 (ref 0–0.07)
IMM GRANULOCYTES NFR BLD AUTO: 0.4 %
INR PPP: 1.02 (ref 0.85–1.13)
LYMPHOCYTES ABSOLUTE: 0.6 THOU/MM3 (ref 1–4.8)
LYMPHOCYTES NFR BLD AUTO: 7.6 %
MCH RBC QN AUTO: 36.9 PG (ref 26–33)
MCHC RBC AUTO-ENTMCNC: 35 GM/DL (ref 32.2–35.5)
MCV RBC AUTO: 105.4 FL (ref 80–94)
MONOCYTES ABSOLUTE: 0.3 THOU/MM3 (ref 0.4–1.3)
MONOCYTES NFR BLD AUTO: 3.8 %
NEUTROPHILS ABSOLUTE: 6.7 THOU/MM3 (ref 1.8–7.7)
NEUTROPHILS NFR BLD AUTO: 87.2 %
NRBC BLD AUTO-RTO: 0 /100 WBC
OSMOLALITY SERPL CALC.SUM OF ELEC: 287.7 MOSMOL/KG (ref 275–300)
PLATELET # BLD AUTO: 148 THOU/MM3 (ref 130–400)
PMV BLD AUTO: 10.3 FL (ref 9.4–12.4)
POTASSIUM SERPL-SCNC: 3.8 MEQ/L (ref 3.5–5.2)
PROT SERPL-MCNC: 5.9 G/DL (ref 6.1–8)
RBC # BLD AUTO: 3.55 MILL/MM3 (ref 4.7–6.1)
SODIUM SERPL-SCNC: 143 MEQ/L (ref 135–145)
WBC # BLD AUTO: 7.7 THOU/MM3 (ref 4.8–10.8)

## 2024-11-12 PROCEDURE — 85610 PROTHROMBIN TIME: CPT

## 2024-11-12 PROCEDURE — 96374 THER/PROPH/DIAG INJ IV PUSH: CPT

## 2024-11-12 PROCEDURE — 85730 THROMBOPLASTIN TIME PARTIAL: CPT

## 2024-11-12 PROCEDURE — 96375 TX/PRO/DX INJ NEW DRUG ADDON: CPT

## 2024-11-12 PROCEDURE — 36415 COLL VENOUS BLD VENIPUNCTURE: CPT

## 2024-11-12 PROCEDURE — 80053 COMPREHEN METABOLIC PANEL: CPT

## 2024-11-12 PROCEDURE — 73502 X-RAY EXAM HIP UNI 2-3 VIEWS: CPT

## 2024-11-12 PROCEDURE — 99285 EMERGENCY DEPT VISIT HI MDM: CPT

## 2024-11-12 PROCEDURE — 1200000000 HC SEMI PRIVATE

## 2024-11-12 PROCEDURE — 6360000002 HC RX W HCPCS: Performed by: STUDENT IN AN ORGANIZED HEALTH CARE EDUCATION/TRAINING PROGRAM

## 2024-11-12 PROCEDURE — 85025 COMPLETE CBC W/AUTO DIFF WBC: CPT

## 2024-11-12 PROCEDURE — 93005 ELECTROCARDIOGRAM TRACING: CPT | Performed by: STUDENT IN AN ORGANIZED HEALTH CARE EDUCATION/TRAINING PROGRAM

## 2024-11-12 PROCEDURE — 6820000001 HC L2 TRAUMA SURGERY EVALUATION

## 2024-11-12 RX ORDER — ONDANSETRON 2 MG/ML
4 INJECTION INTRAMUSCULAR; INTRAVENOUS ONCE
Status: COMPLETED | OUTPATIENT
Start: 2024-11-12 | End: 2024-11-12

## 2024-11-12 RX ORDER — MORPHINE SULFATE 4 MG/ML
4 INJECTION, SOLUTION INTRAMUSCULAR; INTRAVENOUS ONCE
Status: COMPLETED | OUTPATIENT
Start: 2024-11-12 | End: 2024-11-12

## 2024-11-12 RX ADMIN — MORPHINE SULFATE 4 MG: 4 INJECTION, SOLUTION INTRAMUSCULAR; INTRAVENOUS at 21:45

## 2024-11-12 RX ADMIN — ONDANSETRON 4 MG: 2 INJECTION INTRAMUSCULAR; INTRAVENOUS at 21:45

## 2024-11-12 RX ADMIN — HYDROMORPHONE HYDROCHLORIDE 1 MG: 1 INJECTION, SOLUTION INTRAMUSCULAR; INTRAVENOUS; SUBCUTANEOUS at 23:20

## 2024-11-13 ENCOUNTER — APPOINTMENT (OUTPATIENT)
Dept: GENERAL RADIOLOGY | Age: 74
DRG: 536 | End: 2024-11-13
Payer: MEDICARE

## 2024-11-13 ENCOUNTER — APPOINTMENT (OUTPATIENT)
Dept: INTERVENTIONAL RADIOLOGY/VASCULAR | Age: 74
DRG: 536 | End: 2024-11-13
Payer: MEDICARE

## 2024-11-13 ENCOUNTER — APPOINTMENT (OUTPATIENT)
Age: 74
DRG: 536 | End: 2024-11-13
Payer: MEDICARE

## 2024-11-13 LAB
ECHO AO ASC DIAM: 3.8 CM
ECHO AO ASCENDING AORTA INDEX: 1.66 CM/M2
ECHO AV CUSP MM: 2.5 CM
ECHO AV MEAN GRADIENT: 6 MMHG
ECHO AV MEAN VELOCITY: 1.1 M/S
ECHO AV PEAK GRADIENT: 11 MMHG
ECHO AV PEAK VELOCITY: 1.6 M/S
ECHO AV VELOCITY RATIO: 0.75
ECHO AV VTI: 35.7 CM
ECHO BSA: 2.33 M2
ECHO BSA: 2.33 M2
ECHO EST RA PRESSURE: 8 MMHG
ECHO LA AREA 2C: 22.3 CM2
ECHO LA AREA 4C: 26.7 CM2
ECHO LA DIAMETER INDEX: 1.14 CM/M2
ECHO LA DIAMETER: 2.6 CM
ECHO LA MAJOR AXIS: 7.1 CM
ECHO LA MINOR AXIS: 6.5 CM
ECHO LA VOL BP: 74 ML (ref 18–58)
ECHO LA VOL MOD A2C: 64 ML (ref 18–58)
ECHO LA VOL MOD A4C: 79 ML (ref 18–58)
ECHO LA VOL/BSA BIPLANE: 32 ML/M2 (ref 16–34)
ECHO LA VOLUME INDEX MOD A2C: 28 ML/M2 (ref 16–34)
ECHO LA VOLUME INDEX MOD A4C: 34 ML/M2 (ref 16–34)
ECHO LV E' LATERAL VELOCITY: 7.2 CM/S
ECHO LV E' SEPTAL VELOCITY: 6.6 CM/S
ECHO LV EDV A2C: 87 ML
ECHO LV EDV A4C: 126 ML
ECHO LV EDV INDEX A4C: 55 ML/M2
ECHO LV EDV NDEX A2C: 38 ML/M2
ECHO LV EF PHYSICIAN: 65 %
ECHO LV EJECTION FRACTION A2C: 51 %
ECHO LV EJECTION FRACTION A4C: 60 %
ECHO LV EJECTION FRACTION BIPLANE: 57 % (ref 55–100)
ECHO LV ESV A2C: 43 ML
ECHO LV ESV A4C: 51 ML
ECHO LV ESV INDEX A2C: 19 ML/M2
ECHO LV ESV INDEX A4C: 22 ML/M2
ECHO LV FRACTIONAL SHORTENING: 39 % (ref 28–44)
ECHO LV INTERNAL DIMENSION DIASTOLE INDEX: 2.23 CM/M2
ECHO LV INTERNAL DIMENSION DIASTOLIC: 5.1 CM (ref 4.2–5.9)
ECHO LV INTERNAL DIMENSION SYSTOLIC INDEX: 1.35 CM/M2
ECHO LV INTERNAL DIMENSION SYSTOLIC: 3.1 CM
ECHO LV ISOVOLUMETRIC RELAXATION TIME (IVRT): 70 MS
ECHO LV IVSD: 1.6 CM (ref 0.6–1)
ECHO LV MASS 2D: 349 G (ref 88–224)
ECHO LV MASS INDEX 2D: 152.4 G/M2 (ref 49–115)
ECHO LV POSTERIOR WALL DIASTOLIC: 1.5 CM (ref 0.6–1)
ECHO LV RELATIVE WALL THICKNESS RATIO: 0.59
ECHO LVOT AV VTI INDEX: 0.78
ECHO LVOT MEAN GRADIENT: 3 MMHG
ECHO LVOT PEAK GRADIENT: 6 MMHG
ECHO LVOT PEAK VELOCITY: 1.2 M/S
ECHO LVOT VTI: 27.7 CM
ECHO MV A VELOCITY: 0.95 M/S
ECHO MV E DECELERATION TIME (DT): 370 MS
ECHO MV E VELOCITY: 0.75 M/S
ECHO MV E/A RATIO: 0.79
ECHO MV E/E' LATERAL: 10.42
ECHO MV E/E' RATIO (AVERAGED): 10.89
ECHO MV E/E' SEPTAL: 11.36
ECHO PULMONARY ARTERY END DIASTOLIC PRESSURE: 3 MMHG
ECHO PV MAX VELOCITY: 0.9 M/S
ECHO PV PEAK GRADIENT: 3 MMHG
ECHO PV REGURGITANT MAX VELOCITY: 0.8 M/S
ECHO RIGHT VENTRICULAR SYSTOLIC PRESSURE (RVSP): 28 MMHG
ECHO RV INTERNAL DIMENSION: 3.4 CM
ECHO RV TAPSE: 2.3 CM (ref 1.7–?)
ECHO TV E WAVE: 0.7 M/S
ECHO TV REGURGITANT MAX VELOCITY: 2.23 M/S
ECHO TV REGURGITANT PEAK GRADIENT: 20 MMHG

## 2024-11-13 PROCEDURE — 97110 THERAPEUTIC EXERCISES: CPT

## 2024-11-13 PROCEDURE — 6370000000 HC RX 637 (ALT 250 FOR IP): Performed by: PHYSICIAN ASSISTANT

## 2024-11-13 PROCEDURE — 6370000000 HC RX 637 (ALT 250 FOR IP)

## 2024-11-13 PROCEDURE — 93306 TTE W/DOPPLER COMPLETE: CPT | Performed by: INTERNAL MEDICINE

## 2024-11-13 PROCEDURE — 1200000000 HC SEMI PRIVATE

## 2024-11-13 PROCEDURE — 93971 EXTREMITY STUDY: CPT

## 2024-11-13 PROCEDURE — 93306 TTE W/DOPPLER COMPLETE: CPT

## 2024-11-13 PROCEDURE — 71045 X-RAY EXAM CHEST 1 VIEW: CPT

## 2024-11-13 PROCEDURE — 6360000002 HC RX W HCPCS: Performed by: PHYSICIAN ASSISTANT

## 2024-11-13 PROCEDURE — 99233 SBSQ HOSP IP/OBS HIGH 50: CPT

## 2024-11-13 PROCEDURE — 97162 PT EVAL MOD COMPLEX 30 MIN: CPT

## 2024-11-13 PROCEDURE — 97530 THERAPEUTIC ACTIVITIES: CPT

## 2024-11-13 RX ORDER — HYDROCODONE BITARTRATE AND ACETAMINOPHEN 5; 325 MG/1; MG/1
1 TABLET ORAL EVERY 6 HOURS PRN
Status: DISCONTINUED | OUTPATIENT
Start: 2024-11-13 | End: 2024-11-14

## 2024-11-13 RX ORDER — CEPHALEXIN 500 MG/1
500 CAPSULE ORAL EVERY 6 HOURS SCHEDULED
Status: COMPLETED | OUTPATIENT
Start: 2024-11-13 | End: 2024-11-20

## 2024-11-13 RX ORDER — ENOXAPARIN SODIUM 100 MG/ML
30 INJECTION SUBCUTANEOUS 2 TIMES DAILY
Status: DISCONTINUED | OUTPATIENT
Start: 2024-11-14 | End: 2024-11-20 | Stop reason: HOSPADM

## 2024-11-13 RX ORDER — NITROGLYCERIN 0.4 MG/1
0.4 TABLET SUBLINGUAL EVERY 5 MIN PRN
Status: DISCONTINUED | OUTPATIENT
Start: 2024-11-13 | End: 2024-11-20 | Stop reason: HOSPADM

## 2024-11-13 RX ORDER — BUMETANIDE 1 MG/1
2 TABLET ORAL DAILY
Status: DISCONTINUED | OUTPATIENT
Start: 2024-11-13 | End: 2024-11-20 | Stop reason: HOSPADM

## 2024-11-13 RX ORDER — METOPROLOL SUCCINATE 25 MG/1
25 TABLET, EXTENDED RELEASE ORAL DAILY
Status: DISCONTINUED | OUTPATIENT
Start: 2024-11-13 | End: 2024-11-20 | Stop reason: HOSPADM

## 2024-11-13 RX ORDER — LOSARTAN POTASSIUM 100 MG/1
100 TABLET ORAL DAILY
Status: DISCONTINUED | OUTPATIENT
Start: 2024-11-13 | End: 2024-11-13

## 2024-11-13 RX ORDER — POTASSIUM CHLORIDE 750 MG/1
10 TABLET, EXTENDED RELEASE ORAL DAILY
Status: DISCONTINUED | OUTPATIENT
Start: 2024-11-13 | End: 2024-11-20 | Stop reason: HOSPADM

## 2024-11-13 RX ORDER — ATORVASTATIN CALCIUM 40 MG/1
40 TABLET, FILM COATED ORAL DAILY
Status: DISCONTINUED | OUTPATIENT
Start: 2024-11-13 | End: 2024-11-20 | Stop reason: HOSPADM

## 2024-11-13 RX ORDER — CYCLOBENZAPRINE HCL 10 MG
10 TABLET ORAL 3 TIMES DAILY PRN
Status: DISCONTINUED | OUTPATIENT
Start: 2024-11-13 | End: 2024-11-20 | Stop reason: HOSPADM

## 2024-11-13 RX ORDER — DOCUSATE SODIUM 100 MG/1
100 CAPSULE, LIQUID FILLED ORAL DAILY
Status: DISCONTINUED | OUTPATIENT
Start: 2024-11-13 | End: 2024-11-20 | Stop reason: HOSPADM

## 2024-11-13 RX ORDER — MORPHINE SULFATE 2 MG/ML
2 INJECTION, SOLUTION INTRAMUSCULAR; INTRAVENOUS
Status: DISCONTINUED | OUTPATIENT
Start: 2024-11-13 | End: 2024-11-18

## 2024-11-13 RX ORDER — HYDRALAZINE HYDROCHLORIDE 20 MG/ML
5 INJECTION INTRAMUSCULAR; INTRAVENOUS EVERY 6 HOURS PRN
Status: DISCONTINUED | OUTPATIENT
Start: 2024-11-13 | End: 2024-11-20 | Stop reason: HOSPADM

## 2024-11-13 RX ORDER — LOSARTAN POTASSIUM 100 MG/1
100 TABLET ORAL DAILY
Status: DISCONTINUED | OUTPATIENT
Start: 2024-11-13 | End: 2024-11-18

## 2024-11-13 RX ADMIN — METOPROLOL SUCCINATE 25 MG: 25 TABLET, FILM COATED, EXTENDED RELEASE ORAL at 09:22

## 2024-11-13 RX ADMIN — LOSARTAN POTASSIUM 100 MG: 100 TABLET, FILM COATED ORAL at 00:46

## 2024-11-13 RX ADMIN — POTASSIUM CHLORIDE 10 MEQ: 750 TABLET, EXTENDED RELEASE ORAL at 09:23

## 2024-11-13 RX ADMIN — CEPHALEXIN 500 MG: 500 CAPSULE ORAL at 05:29

## 2024-11-13 RX ADMIN — CYCLOBENZAPRINE 10 MG: 10 TABLET, FILM COATED ORAL at 20:15

## 2024-11-13 RX ADMIN — CEPHALEXIN 500 MG: 500 CAPSULE ORAL at 17:52

## 2024-11-13 RX ADMIN — ATORVASTATIN CALCIUM 40 MG: 40 TABLET, FILM COATED ORAL at 09:24

## 2024-11-13 RX ADMIN — HYDROCODONE BITARTRATE AND ACETAMINOPHEN 1 TABLET: 5; 325 TABLET ORAL at 22:37

## 2024-11-13 RX ADMIN — CEPHALEXIN 500 MG: 500 CAPSULE ORAL at 22:37

## 2024-11-13 RX ADMIN — HYDROCODONE BITARTRATE AND ACETAMINOPHEN 1 TABLET: 5; 325 TABLET ORAL at 09:23

## 2024-11-13 RX ADMIN — HYDROCODONE BITARTRATE AND ACETAMINOPHEN 1 TABLET: 5; 325 TABLET ORAL at 15:44

## 2024-11-13 RX ADMIN — DOCUSATE SODIUM 100 MG: 100 CAPSULE, LIQUID FILLED ORAL at 09:23

## 2024-11-13 RX ADMIN — Medication 1 TABLET: at 09:23

## 2024-11-13 RX ADMIN — CEPHALEXIN 500 MG: 500 CAPSULE ORAL at 12:52

## 2024-11-13 RX ADMIN — CYCLOBENZAPRINE 10 MG: 10 TABLET, FILM COATED ORAL at 12:53

## 2024-11-13 RX ADMIN — Medication 1 TABLET: at 20:15

## 2024-11-13 RX ADMIN — MORPHINE SULFATE 2 MG: 2 INJECTION, SOLUTION INTRAMUSCULAR; INTRAVENOUS at 02:36

## 2024-11-13 RX ADMIN — BUMETANIDE 2 MG: 1 TABLET ORAL at 09:24

## 2024-11-13 ASSESSMENT — PAIN SCALES - GENERAL
PAINLEVEL_OUTOF10: 5
PAINLEVEL_OUTOF10: 3
PAINLEVEL_OUTOF10: 3
PAINLEVEL_OUTOF10: 6
PAINLEVEL_OUTOF10: 8
PAINLEVEL_OUTOF10: 4
PAINLEVEL_OUTOF10: 8
PAINLEVEL_OUTOF10: 6
PAINLEVEL_OUTOF10: 3
PAINLEVEL_OUTOF10: 3
PAINLEVEL_OUTOF10: 7
PAINLEVEL_OUTOF10: 7

## 2024-11-13 ASSESSMENT — PAIN DESCRIPTION - DESCRIPTORS
DESCRIPTORS: ACHING
DESCRIPTORS: ACHING;DISCOMFORT
DESCRIPTORS: ACHING
DESCRIPTORS: ACHING;SORE
DESCRIPTORS: ACHING;DISCOMFORT
DESCRIPTORS: SPASM
DESCRIPTORS: SHARP

## 2024-11-13 ASSESSMENT — PAIN DESCRIPTION - LOCATION
LOCATION: HIP

## 2024-11-13 ASSESSMENT — PAIN DESCRIPTION - ORIENTATION
ORIENTATION: LEFT

## 2024-11-13 ASSESSMENT — PAIN - FUNCTIONAL ASSESSMENT
PAIN_FUNCTIONAL_ASSESSMENT: PREVENTS OR INTERFERES SOME ACTIVE ACTIVITIES AND ADLS
PAIN_FUNCTIONAL_ASSESSMENT: ACTIVITIES ARE NOT PREVENTED

## 2024-11-13 ASSESSMENT — PAIN DESCRIPTION - FREQUENCY: FREQUENCY: CONTINUOUS

## 2024-11-13 ASSESSMENT — PAIN DESCRIPTION - ONSET: ONSET: PROGRESSIVE

## 2024-11-13 ASSESSMENT — PAIN DESCRIPTION - PAIN TYPE: TYPE: ACUTE PAIN

## 2024-11-13 NOTE — ED PROVIDER NOTES
[facility] EMERGENCY DEPT  EMERGENCY DEPARTMENT ENCOUNTER          Pt Name: Marty Basilio  MRN: [id number]  Birthdate 1950  Date of evaluation: 11/12/2024  Physician: Jose Alberto Mccormick DO      CHIEF COMPLAINT       Chief Complaint   Patient presents with    Hip Pain     lt         HISTORY OF PRESENT ILLNESS    HPI  Marty Basilio is a 74 y.o. male who presents to the emergency department from home, brought in by EMS for evaluation of left hip pain.  Patient lost his balance when going up 3 steps from his garage into his house.  He fell on his left hip.  Patient reports hip replacement in that left hip.  He reports severe pain.  Was unable to get up or ambulate.  Denies hitting his head.  The patient has no other acute complaints at this time.      REVIEW OF SYSTEMS   Review of Systems   All other systems reviewed and are negative.        PAST MEDICAL AND SURGICAL HISTORY     Past Medical History:   Diagnosis Date    CAD (coronary artery disease)     Hyperlipidemia     Hypertension     Obesity      Past Surgical History:   Procedure Laterality Date    ACHILLES TENDON SURGERY Right 1985    CARDIAC CATHETERIZATION  [id number]    NECK SURGERY  1999    Lumpectomy    TOTAL HIP ARTHROPLASTY Right 2016         MEDICATIONS   No current facility-administered medications for this encounter.    Current Outpatient Medications:     potassium chloride (KLOR-CON) 10 MEQ extended release tablet, TAKE 1 TABLET BY MOUTH EVERY DAY WITH FOOD AS NEEDED, Disp: 90 tablet, Rfl: 3    bumetanide (BUMEX) 2 MG tablet, TAKE 1 TABLET BY MOUTH EVERY DAY IN THE MORNING, Disp: 90 tablet, Rfl: 3    atorvastatin (LIPITOR) 40 MG tablet, Take 1 tablet by mouth daily, Disp: , Rfl:     CALCIUM 500/D 500-400 MG-UNIT CHEW, Take 1 tablet by mouth 2 times daily, Disp: , Rfl:     nitroGLYCERIN (NITROSTAT) 0.4 MG SL tablet, Place 1 tablet under the tongue every 5 minutes as needed for Chest pain Dissolve 1 tablet under tongue

## 2024-11-13 NOTE — H&P
147/54   Pulse 59   Temp 98 °F (36.7 °C) (Oral)   Resp 18   Ht 1.854 m (6' 1\")   Wt 105.3 kg (232 lb 2.3 oz)   SpO2 98%   BMI 30.63 kg/m²   GENERAL APPEARANCE: Awake and oriented x4. No acute distress, except appropriate to injury.  MOOD AND AFFECT: Calm appropriate to situation  HEAD: normocephalic, atraumatic   EYES: equal and reactive to light, Extraocular movements are normal. Pupils are equal in size.  Hematologic/Lymphatic: no lymphadenopathy to upper or lower extremity.   MSK  LLE:- atraumatic hip, knee, ankle, no lacerations or lesions.venous stasis skin changes distally. Prior incision cdi. Sitting in neutral alignment. Compartments soft.  Nontender to palpation asis iliac crest, TTP greater troch, nontender groin femoral shaft medial lateral joint line, tibia shaft, medial lateral mal, midfoot, calc, calf supple nontender,  Able to perform SLR, gastroc ta ehl intact, some pain with  IR ER through hip. sensation to light touch intact, distal pulses palpable        Labs:   CBC:   Lab Results   Component Value Date/Time    WBC 7.7 11/12/2024 10:28 PM    RBC 3.55 11/12/2024 10:28 PM     BMP:  Lab Results   Component Value Date/Time    GLUCOSE 114 11/12/2024 10:28 PM    CO2 26 11/12/2024 10:28 PM    BUN 18 11/12/2024 10:28 PM    CREATININE 0.8 11/12/2024 10:28 PM    CALCIUM 8.6 11/12/2024 10:28 PM     PT/INR:   Lab Results   Component Value Date/Time    INR 1.02 11/12/2024 10:28 PM    APTT 26.2 11/12/2024 10:28 PM     Type and Screen:   Lab Results   Component Value Date/Time    LABANTI NEG 01/08/2018 11:16 AM     CRP: No results found for: \"CRP\"  ESR: No results found for: \"SEDRATE\"  HgBA1c:  No results found for: \"LABA1C\"        Radiology:   XR: 4 view, pelvis and left hip     Comparison: None     Findings:  There are bilateral total hip arthroplasties with intact hardware and   associated heterotopic ossifications.  There is an intertrochanteric left hip fracture with subtrochanteric   extension.  No

## 2024-11-13 NOTE — ED NOTES
ED to inpatient nurses report      Chief Complaint:  Chief Complaint   Patient presents with    Hip Pain     lt     Present to ED from: home    MOA:     LOC: alert and orientated to name, place, date  Mobility: Requires assistance * 1  Oxygen Baseline: ra    Current needs required: ra     Code Status:   No Order    What abnormal results were found and what did you give/do to treat them? Fracture hip  Any procedures or intervention occur? N/a    Mental Status:  Level of Consciousness: Alert (0)    Psych Assessment:        Vitals:  Patient Vitals for the past 24 hrs:   BP Temp Temp src Pulse Resp SpO2   11/12/24 2241 (!) 188/72 -- -- 66 16 96 %   11/12/24 2135 (!) 206/75 -- -- 64 16 96 %   11/12/24 2036 (!) 208/84 97.7 °F (36.5 °C) Oral 65 16 96 %        LDAs:   Peripheral IV 11/12/24 Right Hand (Active)   Site Assessment Clean, dry & intact 11/12/24 2233       Ambulatory Status:  No data recorded    Diagnosis:  DISPOSITION Admitted 11/12/2024 10:21:57 PM   Final diagnoses:   Periprosthetic fracture around internal prosthetic left hip joint, initial encounter (HCC)        Consults:  IP CONSULT TO HOSPITALIST     Pain Score:       C-SSRS:        Sepsis Screening:       Marshall Fall Risk:       Swallow Screening        Preferred Language:   English      ALLERGIES     Patient has no known allergies.    SURGICAL HISTORY       Past Surgical History:   Procedure Laterality Date    ACHILLES TENDON SURGERY Right 1985    CARDIAC CATHETERIZATION  [id number]    NECK SURGERY  1999    Lumpectomy    TOTAL HIP ARTHROPLASTY Right 2016       PAST MEDICAL HISTORY       Past Medical History:   Diagnosis Date    CAD (coronary artery disease)     Hyperlipidemia     Hypertension     Obesity            Electronically signed by Shani Bowers RN on 11/12/2024 at 11:15 PM

## 2024-11-13 NOTE — CONSULTS
Hospitalist Initial Consult Note      Patient:  Marty Basilio    Unit/Bed:7K-19/019-A  YOB: 1950  MRN: [id number]   Acct: [id number]     PCP: Alondra Pettit APRN - CNP  Date of Admission: 11/12/2024     Chief Complaint: Left hip pain  Reason for consult medical management, cardiac risk assessment    Date of Service: Pt seen/examined in consultation on 11/13/2024     History Of Present Illness:      Marty Basilio74 y.o. male who we are asked to see/evaluate by Luís Huizar DO for medical management of CAD, hypertension.  Patient reports walking up to stairs to get from his garage into his house.  Reports that he was utilizing his cane in 1 hand and carrying a bag of food and the other.  When he went to step over the threshold, he states that he lost his balance and fell backwards landing against a chayo basket and onto his left hip.  Reports that he was unable to stand up and had to crawl out of the garage and call for help.  Patient reports mechanical fall only.  Denies any lightheadedness, chest pain, shortness of breath prior to or since the event.  In ER, patient found to have left hip fracture.  Admitted under orthopedic service.  Upon exam, patient is resting in bed.  Noted to be hypertensive.  States he missed a couple of his blood pressure medications yesterday.  Reports history of lymphedema.  Noted to have significant erythema, warmth, swelling to right calf compared to left.  States that approximately week ago he hit his leg into outdoor equipment and has had issues with increased swelling and erythema since then.  Noted to have scab/healing wound to lower right extremity.          Assessment and Plan:-  Left hip fracture: X-ray showing intertrochanteric periprosthetic left hip fracture with subtrochanteric extension.  Admitted under orthopedic service.  Hypertensive urgency: Likely secondary to pain as well as not taking BP medication yesterday.

## 2024-11-13 NOTE — ED NOTES
Spoke to Rubi on 7K who approved patient transfer to 7K-19. Patient transported upstairs in stable condition.

## 2024-11-13 NOTE — ED NOTES
Pt to er. Pt states he was walking up a stair in his garage and slipped and fell backwards on lt hip . Pt states hit head on chayo basket. No LOC> Pt c/o lt hip pain. States was unable to walk after. Called neighbors and they called EMS. Pt A & o x 4. Resp regular.

## 2024-11-13 NOTE — CARE COORDINATION
Case Management Assessment Initial Evaluation    Date/Time of Evaluation: 11/13/2024 8:36 AM  Assessment Completed by: Meme Blank RN    If patient is discharged prior to next notation, then this note serves as note for discharge by case management.    Patient Name: Marty Basilio                   YOB: 1950  Diagnosis: Periprosthetic fracture around internal prosthetic left hip joint, initial encounter ([facility]) [M97.02XA]                   Date / Time: 11/12/2024  8:32 PM  [facility]: [facility]     Patient Admission Status: Inpatient   Readmission Risk Low 0-14, Mod 15-19), High > 20: Readmission Risk Score: 11.3    Current PCP: Alondra Pettit APRN - CNP  Health Care Decision Makers:   Primary Decision Maker: Austyn Frederick - Brother/Sister - [phone]    Additional Case Management Notes: to ED  from via EMS for evaluation of left hip pain.  Patient lost his balance when going up 3 steps and fell on his left hip.  Patient reports hip replacement in that left hip, unable to get up ambulate.    PT/OT evals, 50% WB LLE with walker, hospitalist consulted Ortho primary     Procedures: closed treatment L proximal femur periprosthetic fracture     Imaging:   Left hip and pelvic xray:  Intertrochanteric periprosthetic left hip fracture with subtrochanteric   extension.   CXR:   Suspected acute mild medial basilar atelectasis superimposed on chronic   mild interstitial thickening. Moderate cardiomegaly.   Patient Goals/Plan/Treatment Preferences: Spoke with Marty; he resides home alone. He has walker and cane, HCDM verified. He request BSC; will follow plan of care.  May need SNF if has surgery; following orthopedics recs.            11/13/24 1401   Service Assessment   Patient Orientation Alert and Oriented   Cognition Short Term Memory Deficit   History Provided By Patient   Primary Caregiver Self   Accompanied By/Relationship unaccomp.   Support Systems Family Members;Friends/Neighbors

## 2024-11-14 LAB
EKG ATRIAL RATE: 58 BPM
EKG P AXIS: 32 DEGREES
EKG P-R INTERVAL: 212 MS
EKG Q-T INTERVAL: 424 MS
EKG QRS DURATION: 88 MS
EKG QTC CALCULATION (BAZETT): 416 MS
EKG R AXIS: -15 DEGREES
EKG T AXIS: 32 DEGREES
EKG VENTRICULAR RATE: 58 BPM
NT-PROBNP SERPL IA-MCNC: 327.4 PG/ML (ref 0–449)

## 2024-11-14 PROCEDURE — 6360000002 HC RX W HCPCS: Performed by: PHYSICIAN ASSISTANT

## 2024-11-14 PROCEDURE — 6370000000 HC RX 637 (ALT 250 FOR IP)

## 2024-11-14 PROCEDURE — 81001 URINALYSIS AUTO W/SCOPE: CPT

## 2024-11-14 PROCEDURE — 51798 US URINE CAPACITY MEASURE: CPT

## 2024-11-14 PROCEDURE — 87086 URINE CULTURE/COLONY COUNT: CPT

## 2024-11-14 PROCEDURE — 97110 THERAPEUTIC EXERCISES: CPT

## 2024-11-14 PROCEDURE — 99232 SBSQ HOSP IP/OBS MODERATE 35: CPT | Performed by: PHYSICIAN ASSISTANT

## 2024-11-14 PROCEDURE — 1200000000 HC SEMI PRIVATE

## 2024-11-14 PROCEDURE — 6370000000 HC RX 637 (ALT 250 FOR IP): Performed by: PHYSICIAN ASSISTANT

## 2024-11-14 PROCEDURE — 51701 INSERT BLADDER CATHETER: CPT

## 2024-11-14 PROCEDURE — 36415 COLL VENOUS BLD VENIPUNCTURE: CPT

## 2024-11-14 PROCEDURE — 83880 ASSAY OF NATRIURETIC PEPTIDE: CPT

## 2024-11-14 PROCEDURE — 6360000002 HC RX W HCPCS

## 2024-11-14 PROCEDURE — 93010 ELECTROCARDIOGRAM REPORT: CPT | Performed by: INTERNAL MEDICINE

## 2024-11-14 PROCEDURE — 97530 THERAPEUTIC ACTIVITIES: CPT

## 2024-11-14 RX ORDER — OXYCODONE HYDROCHLORIDE 5 MG/1
5 TABLET ORAL EVERY 4 HOURS PRN
Status: DISCONTINUED | OUTPATIENT
Start: 2024-11-14 | End: 2024-11-20 | Stop reason: HOSPADM

## 2024-11-14 RX ORDER — OXYCODONE HYDROCHLORIDE 5 MG/1
10 TABLET ORAL EVERY 4 HOURS PRN
Status: DISCONTINUED | OUTPATIENT
Start: 2024-11-14 | End: 2024-11-20 | Stop reason: HOSPADM

## 2024-11-14 RX ADMIN — MORPHINE SULFATE 2 MG: 2 INJECTION, SOLUTION INTRAMUSCULAR; INTRAVENOUS at 00:50

## 2024-11-14 RX ADMIN — POTASSIUM CHLORIDE 10 MEQ: 750 TABLET, EXTENDED RELEASE ORAL at 08:33

## 2024-11-14 RX ADMIN — OXYCODONE 10 MG: 5 TABLET ORAL at 13:54

## 2024-11-14 RX ADMIN — BUMETANIDE 2 MG: 1 TABLET ORAL at 08:33

## 2024-11-14 RX ADMIN — ATORVASTATIN CALCIUM 40 MG: 40 TABLET, FILM COATED ORAL at 08:34

## 2024-11-14 RX ADMIN — CYCLOBENZAPRINE 10 MG: 10 TABLET, FILM COATED ORAL at 11:52

## 2024-11-14 RX ADMIN — LOSARTAN POTASSIUM 100 MG: 100 TABLET, FILM COATED ORAL at 08:34

## 2024-11-14 RX ADMIN — MORPHINE SULFATE 2 MG: 2 INJECTION, SOLUTION INTRAMUSCULAR; INTRAVENOUS at 11:52

## 2024-11-14 RX ADMIN — HYDRALAZINE HYDROCHLORIDE 5 MG: 20 INJECTION, SOLUTION INTRAMUSCULAR; INTRAVENOUS at 20:20

## 2024-11-14 RX ADMIN — DOCUSATE SODIUM 100 MG: 100 CAPSULE, LIQUID FILLED ORAL at 08:33

## 2024-11-14 RX ADMIN — CEPHALEXIN 500 MG: 500 CAPSULE ORAL at 17:41

## 2024-11-14 RX ADMIN — HYDROCODONE BITARTRATE AND ACETAMINOPHEN 1 TABLET: 5; 325 TABLET ORAL at 04:37

## 2024-11-14 RX ADMIN — ENOXAPARIN SODIUM 30 MG: 100 INJECTION SUBCUTANEOUS at 08:33

## 2024-11-14 RX ADMIN — Medication 1 TABLET: at 08:33

## 2024-11-14 RX ADMIN — OXYCODONE 5 MG: 5 TABLET ORAL at 22:51

## 2024-11-14 RX ADMIN — CEPHALEXIN 500 MG: 500 CAPSULE ORAL at 11:57

## 2024-11-14 RX ADMIN — Medication 1 TABLET: at 20:20

## 2024-11-14 RX ADMIN — OXYCODONE 10 MG: 5 TABLET ORAL at 08:37

## 2024-11-14 RX ADMIN — ENOXAPARIN SODIUM 30 MG: 100 INJECTION SUBCUTANEOUS at 20:20

## 2024-11-14 RX ADMIN — CEPHALEXIN 500 MG: 500 CAPSULE ORAL at 04:39

## 2024-11-14 ASSESSMENT — PAIN SCALES - GENERAL
PAINLEVEL_OUTOF10: 4
PAINLEVEL_OUTOF10: 7
PAINLEVEL_OUTOF10: 2
PAINLEVEL_OUTOF10: 10
PAINLEVEL_OUTOF10: 6
PAINLEVEL_OUTOF10: 2
PAINLEVEL_OUTOF10: 6
PAINLEVEL_OUTOF10: 2
PAINLEVEL_OUTOF10: 7
PAINLEVEL_OUTOF10: 9
PAINLEVEL_OUTOF10: 6

## 2024-11-14 ASSESSMENT — PAIN DESCRIPTION - ONSET
ONSET: ON-GOING

## 2024-11-14 ASSESSMENT — PAIN DESCRIPTION - DESCRIPTORS
DESCRIPTORS: ACHING;DISCOMFORT;SPASM
DESCRIPTORS: ACHING;SORE
DESCRIPTORS: ACHING;DISCOMFORT
DESCRIPTORS: ACHING
DESCRIPTORS: ACHING;SORE
DESCRIPTORS: SHARP

## 2024-11-14 ASSESSMENT — PAIN - FUNCTIONAL ASSESSMENT
PAIN_FUNCTIONAL_ASSESSMENT: PREVENTS OR INTERFERES SOME ACTIVE ACTIVITIES AND ADLS
PAIN_FUNCTIONAL_ASSESSMENT: ACTIVITIES ARE NOT PREVENTED
PAIN_FUNCTIONAL_ASSESSMENT: PREVENTS OR INTERFERES SOME ACTIVE ACTIVITIES AND ADLS

## 2024-11-14 ASSESSMENT — PAIN DESCRIPTION - ORIENTATION
ORIENTATION: LEFT

## 2024-11-14 ASSESSMENT — PAIN DESCRIPTION - LOCATION
LOCATION: HIP

## 2024-11-14 ASSESSMENT — PAIN DESCRIPTION - PAIN TYPE
TYPE: ACUTE PAIN

## 2024-11-14 ASSESSMENT — PAIN DESCRIPTION - FREQUENCY
FREQUENCY: CONTINUOUS

## 2024-11-14 NOTE — CARE COORDINATION
DISCHARGE PLANNING EVALUATION  11/14/24, 2:45 PM EST    Reason for Referral: discharge plan  Decision Maker: makes own decisions, no POA, next of kin is Austyn ren  Current Services: none   New Services Requested: possible snf  Family/ Social/ Home environment: Marty lives at home alone, has been managing his own care with limited support.   His sister lives out of state, his  is aware of his current hospitalization  Payment Source: [employer] medicare   Transportation at Discharge: undetermined   Post-acute (PAC) provider list was provided to patient. Patient was informed of their freedom to choose PAC provider. Discussed and offered to show the patient the relevant PAC Providers quality and resource use measures on Medicare Compare web site via computer based on patient's goals of care and treatment preferences. Questions regarding selection process were answered.      Teach Back Method used with patient regarding care plan and discharge plan  Patient  verbalized understanding of the plan of care and contribute to goal setting.       Patient preferences and discharge plan:  spoke with patient about discharge plan.  He shares that he is having difficulty with any movement due to pain.  He is reluctant to consider snf, but did accept snf list and plans to review.      Electronically signed by PRISCILLA Mejía on 11/14/2024 at 2:45 PM

## 2024-11-14 NOTE — CONSULTS
Hospitalist Initial Consultation      Patient: Marty Basilio 74 y.o. male     : 1950  Admission date: [unfilled]       ASSESSMENT AND PLAN    Active Problems  Left hip fracture:   X-ray showing intertrochanteric periprosthetic left hip fracture with subtrochanteric extension.  Admitted under orthopedic service.  Closed treatment at this time.  Social work working on placement.  PT and OT per Ortho  Hypertensive urgency-resolved  Likely secondary to pain as well as not taking BP medication yesterday.   Continue losartan, bumex, metoprolol  Right lower extremity cellulitis with bilateral venous stasis and lymphedema:   History of lymphedema however has significant erythema warmth and swelling to right lower extremity from knee to ankle R> L  Doppler ordered and no evidence of DVT  Continue Keflex at this time  Courage compression, leg elevation  Peripheral edema  Echocardiogram ordered demonstrating LVEF of 65% with grade 1 diastolic dysfunction and LVH.  Suspect HFpEF.  Should follow-up with Dr. Donaldson on discharge  Continue with current GDMT of beta-blocker, losartan, Bumex.  BNP ordered-may benefit from additional doses of Bumex pending  Hyperlipidemia: Continue statin         Data Reviewed:  Labs: see chart      Thank you, Luís Huizar DO, for the consultation.  Hospitalist service will continue to follow along.      Electronically signed by Nhung Muller PA-C on 2024 at 2:18 PM    ===================================================================    SUBJECTIVE    Chief Complaint:  Hip pain    Date of Service/Reason for consult: Pt seen/examined  on 24 for medical management of hypertension    History of present illness:    Marty Basilio74 y.o. male who we are asked to see/evaluate by Luís Huizar DO for medical management of CAD, hypertension.  Patient reports walking up to stairs to get from his garage into his house.  Reports that he was utilizing his

## 2024-11-15 LAB
ANION GAP SERPL CALC-SCNC: 9 MEQ/L (ref 8–16)
BACTERIA: NORMAL
BILIRUB UR QL STRIP: NEGATIVE
BUN SERPL-MCNC: 23 MG/DL (ref 7–22)
CALCIUM SERPL-MCNC: 8.9 MG/DL (ref 8.5–10.5)
CASTS #/AREA URNS LPF: NORMAL /LPF
CASTS #/AREA URNS LPF: NORMAL /LPF
CHARACTER UR: CLEAR
CHARCOAL URNS QL MICRO: NORMAL
CHLORIDE SERPL-SCNC: 99 MEQ/L (ref 98–111)
CO2 SERPL-SCNC: 29 MEQ/L (ref 23–33)
COLOR UR: YELLOW
CREAT SERPL-MCNC: 1.1 MG/DL (ref 0.4–1.2)
CRYSTALS URNS QL MICRO: NORMAL
DEPRECATED RDW RBC AUTO: 54.3 FL (ref 35–45)
EPITHELIAL CELLS, UA: NORMAL /HPF
ERYTHROCYTE [DISTWIDTH] IN BLOOD BY AUTOMATED COUNT: 14.9 % (ref 11.5–14.5)
FERRITIN SERPL IA-MCNC: 128 NG/ML (ref 22–322)
FOLATE SERPL-MCNC: 4.4 NG/ML (ref 4.8–24.2)
GFR SERPL CREATININE-BSD FRML MDRD: 70 ML/MIN/1.73M2
GLUCOSE SERPL-MCNC: 111 MG/DL (ref 70–108)
GLUCOSE UR QL STRIP.AUTO: NEGATIVE MG/DL
HCT VFR BLD AUTO: 30.1 % (ref 42–52)
HGB BLD-MCNC: 10.7 GM/DL (ref 14–18)
HGB UR QL STRIP.AUTO: NEGATIVE
IRON SATN MFR SERPL: 16 % (ref 20–50)
IRON SERPL-MCNC: 44 UG/DL (ref 65–195)
KETONES UR QL STRIP.AUTO: NEGATIVE
LEUKOCYTE ESTERASE UR QL STRIP.AUTO: NEGATIVE
MCH RBC QN AUTO: 35.9 PG (ref 26–33)
MCHC RBC AUTO-ENTMCNC: 35.5 GM/DL (ref 32.2–35.5)
MCV RBC AUTO: 101 FL (ref 80–94)
NITRITE UR QL STRIP.AUTO: NEGATIVE
PH UR STRIP.AUTO: 6 [PH] (ref 5–9)
PLATELET # BLD AUTO: 124 THOU/MM3 (ref 130–400)
PMV BLD AUTO: 9.7 FL (ref 9.4–12.4)
POTASSIUM SERPL-SCNC: 3.7 MEQ/L (ref 3.5–5.2)
PROT UR STRIP.AUTO-MCNC: NEGATIVE MG/DL
RBC # BLD AUTO: 2.98 MILL/MM3 (ref 4.7–6.1)
RBC #/AREA URNS HPF: NORMAL /HPF
RENAL EPI CELLS #/AREA URNS HPF: NORMAL /[HPF]
SODIUM SERPL-SCNC: 137 MEQ/L (ref 135–145)
SPECIFIC GRAVITY UA: 1.01 (ref 1–1.03)
TIBC SERPL-MCNC: 276 UG/DL (ref 171–450)
UROBILINOGEN, URINE: 0.2 EU/DL (ref 0–1)
VIT B12 SERPL-MCNC: < 150 PG/ML (ref 211–911)
WBC # BLD AUTO: 4.6 THOU/MM3 (ref 4.8–10.8)
WBC #/AREA URNS HPF: NORMAL /HPF
YEAST LIKE FUNGI URNS QL MICRO: NORMAL

## 2024-11-15 PROCEDURE — 80048 BASIC METABOLIC PNL TOTAL CA: CPT

## 2024-11-15 PROCEDURE — 6370000000 HC RX 637 (ALT 250 FOR IP)

## 2024-11-15 PROCEDURE — 82746 ASSAY OF FOLIC ACID SERUM: CPT

## 2024-11-15 PROCEDURE — 97166 OT EVAL MOD COMPLEX 45 MIN: CPT

## 2024-11-15 PROCEDURE — 36415 COLL VENOUS BLD VENIPUNCTURE: CPT

## 2024-11-15 PROCEDURE — 97535 SELF CARE MNGMENT TRAINING: CPT

## 2024-11-15 PROCEDURE — 85027 COMPLETE CBC AUTOMATED: CPT

## 2024-11-15 PROCEDURE — 97110 THERAPEUTIC EXERCISES: CPT

## 2024-11-15 PROCEDURE — 97530 THERAPEUTIC ACTIVITIES: CPT

## 2024-11-15 PROCEDURE — 6370000000 HC RX 637 (ALT 250 FOR IP): Performed by: PHYSICIAN ASSISTANT

## 2024-11-15 PROCEDURE — 6360000002 HC RX W HCPCS: Performed by: PHYSICIAN ASSISTANT

## 2024-11-15 PROCEDURE — 83550 IRON BINDING TEST: CPT

## 2024-11-15 PROCEDURE — 99231 SBSQ HOSP IP/OBS SF/LOW 25: CPT | Performed by: STUDENT IN AN ORGANIZED HEALTH CARE EDUCATION/TRAINING PROGRAM

## 2024-11-15 PROCEDURE — 83540 ASSAY OF IRON: CPT

## 2024-11-15 PROCEDURE — 1200000000 HC SEMI PRIVATE

## 2024-11-15 PROCEDURE — 82728 ASSAY OF FERRITIN: CPT

## 2024-11-15 PROCEDURE — 82607 VITAMIN B-12: CPT

## 2024-11-15 RX ORDER — ONDANSETRON 4 MG/1
4 TABLET, ORALLY DISINTEGRATING ORAL EVERY 8 HOURS PRN
Status: DISCONTINUED | OUTPATIENT
Start: 2024-11-15 | End: 2024-11-20 | Stop reason: HOSPADM

## 2024-11-15 RX ADMIN — CEPHALEXIN 500 MG: 500 CAPSULE ORAL at 17:51

## 2024-11-15 RX ADMIN — CEPHALEXIN 500 MG: 500 CAPSULE ORAL at 12:24

## 2024-11-15 RX ADMIN — Medication 1 TABLET: at 20:55

## 2024-11-15 RX ADMIN — ATORVASTATIN CALCIUM 40 MG: 40 TABLET, FILM COATED ORAL at 08:50

## 2024-11-15 RX ADMIN — CYCLOBENZAPRINE 10 MG: 10 TABLET, FILM COATED ORAL at 12:27

## 2024-11-15 RX ADMIN — CEPHALEXIN 500 MG: 500 CAPSULE ORAL at 05:05

## 2024-11-15 RX ADMIN — POTASSIUM CHLORIDE 10 MEQ: 750 TABLET, EXTENDED RELEASE ORAL at 08:50

## 2024-11-15 RX ADMIN — OXYCODONE 10 MG: 5 TABLET ORAL at 08:51

## 2024-11-15 RX ADMIN — ENOXAPARIN SODIUM 30 MG: 100 INJECTION SUBCUTANEOUS at 08:50

## 2024-11-15 RX ADMIN — ENOXAPARIN SODIUM 30 MG: 100 INJECTION SUBCUTANEOUS at 20:55

## 2024-11-15 RX ADMIN — CEPHALEXIN 500 MG: 500 CAPSULE ORAL at 00:25

## 2024-11-15 RX ADMIN — DOCUSATE SODIUM 100 MG: 100 CAPSULE, LIQUID FILLED ORAL at 08:50

## 2024-11-15 RX ADMIN — Medication 1 TABLET: at 08:50

## 2024-11-15 ASSESSMENT — PAIN DESCRIPTION - ONSET
ONSET: ON-GOING

## 2024-11-15 ASSESSMENT — PAIN - FUNCTIONAL ASSESSMENT
PAIN_FUNCTIONAL_ASSESSMENT: PREVENTS OR INTERFERES SOME ACTIVE ACTIVITIES AND ADLS
PAIN_FUNCTIONAL_ASSESSMENT: PREVENTS OR INTERFERES SOME ACTIVE ACTIVITIES AND ADLS
PAIN_FUNCTIONAL_ASSESSMENT: PREVENTS OR INTERFERES WITH MANY ACTIVE NOT PASSIVE ACTIVITIES
PAIN_FUNCTIONAL_ASSESSMENT: PREVENTS OR INTERFERES SOME ACTIVE ACTIVITIES AND ADLS

## 2024-11-15 ASSESSMENT — PAIN SCALES - GENERAL
PAINLEVEL_OUTOF10: 2
PAINLEVEL_OUTOF10: 5
PAINLEVEL_OUTOF10: 6
PAINLEVEL_OUTOF10: 5
PAINLEVEL_OUTOF10: 4
PAINLEVEL_OUTOF10: 7
PAINLEVEL_OUTOF10: 6

## 2024-11-15 ASSESSMENT — PAIN DESCRIPTION - ORIENTATION
ORIENTATION: LEFT

## 2024-11-15 ASSESSMENT — PAIN DESCRIPTION - DESCRIPTORS
DESCRIPTORS: ACHING
DESCRIPTORS: ACHING;SPASM

## 2024-11-15 ASSESSMENT — PAIN DESCRIPTION - FREQUENCY
FREQUENCY: INTERMITTENT
FREQUENCY: CONTINUOUS
FREQUENCY: INTERMITTENT

## 2024-11-15 ASSESSMENT — PAIN DESCRIPTION - PAIN TYPE
TYPE: ACUTE PAIN

## 2024-11-15 ASSESSMENT — PAIN DESCRIPTION - LOCATION
LOCATION: HIP

## 2024-11-15 NOTE — CARE COORDINATION
11/15/24, 1:38 PM EST    DISCHARGE PLANNING EVALUATION    Patient is undecided about snf choices yet,  is receptive to snf but has not identified his preferences.  Will need precert for snf.

## 2024-11-15 NOTE — CARE COORDINATION
11/15/24, 2:43 PM EST    DISCHARGE ON GOING EVALUATION    [facility] day: 3  Location: -19/019-A Reason for admit: Periprosthetic fracture around internal prosthetic left hip joint, initial encounter ([facility]) [M97.02XA]     Procedures:   11/13 Echo: ef 65%  11/13 LE doppler: negative for dvt    Imaging since last note: none    Barriers to Discharge: Pain control. Bowel meds. PT/OT. Hospitalist and ortho following.     PCP: Alondra Pettit APRN - CNP  Readmission Risk Score: 13.2    Patient Goals/Plan/Treatment Preferences: Patient reviewing SNF list, will require a precert.

## 2024-11-16 LAB
ANION GAP SERPL CALC-SCNC: 7 MEQ/L (ref 8–16)
BUN SERPL-MCNC: 22 MG/DL (ref 7–22)
CALCIUM SERPL-MCNC: 8.8 MG/DL (ref 8.5–10.5)
CHLORIDE SERPL-SCNC: 103 MEQ/L (ref 98–111)
CO2 SERPL-SCNC: 28 MEQ/L (ref 23–33)
CREAT SERPL-MCNC: 1 MG/DL (ref 0.4–1.2)
DEPRECATED RDW RBC AUTO: 55.7 FL (ref 35–45)
ERYTHROCYTE [DISTWIDTH] IN BLOOD BY AUTOMATED COUNT: 14.7 % (ref 11.5–14.5)
GFR SERPL CREATININE-BSD FRML MDRD: 78 ML/MIN/1.73M2
GLUCOSE SERPL-MCNC: 120 MG/DL (ref 70–108)
HCT VFR BLD AUTO: 26.9 % (ref 42–52)
HGB BLD-MCNC: 9.5 GM/DL (ref 14–18)
MCH RBC QN AUTO: 37 PG (ref 26–33)
MCHC RBC AUTO-ENTMCNC: 35.3 GM/DL (ref 32.2–35.5)
MCV RBC AUTO: 104.7 FL (ref 80–94)
PLATELET # BLD AUTO: 115 THOU/MM3 (ref 130–400)
PMV BLD AUTO: 10.1 FL (ref 9.4–12.4)
POTASSIUM SERPL-SCNC: 3.9 MEQ/L (ref 3.5–5.2)
RBC # BLD AUTO: 2.57 MILL/MM3 (ref 4.7–6.1)
SODIUM SERPL-SCNC: 138 MEQ/L (ref 135–145)
WBC # BLD AUTO: 3.6 THOU/MM3 (ref 4.8–10.8)

## 2024-11-16 PROCEDURE — 6360000002 HC RX W HCPCS: Performed by: PHYSICIAN ASSISTANT

## 2024-11-16 PROCEDURE — 6370000000 HC RX 637 (ALT 250 FOR IP)

## 2024-11-16 PROCEDURE — 6370000000 HC RX 637 (ALT 250 FOR IP): Performed by: STUDENT IN AN ORGANIZED HEALTH CARE EDUCATION/TRAINING PROGRAM

## 2024-11-16 PROCEDURE — 80048 BASIC METABOLIC PNL TOTAL CA: CPT

## 2024-11-16 PROCEDURE — 85027 COMPLETE CBC AUTOMATED: CPT

## 2024-11-16 PROCEDURE — 99232 SBSQ HOSP IP/OBS MODERATE 35: CPT | Performed by: STUDENT IN AN ORGANIZED HEALTH CARE EDUCATION/TRAINING PROGRAM

## 2024-11-16 PROCEDURE — 36415 COLL VENOUS BLD VENIPUNCTURE: CPT

## 2024-11-16 PROCEDURE — 6370000000 HC RX 637 (ALT 250 FOR IP): Performed by: PHYSICIAN ASSISTANT

## 2024-11-16 PROCEDURE — 1200000000 HC SEMI PRIVATE

## 2024-11-16 RX ORDER — FOLIC ACID 1 MG/1
1 TABLET ORAL DAILY
Status: DISCONTINUED | OUTPATIENT
Start: 2024-11-16 | End: 2024-11-20 | Stop reason: HOSPADM

## 2024-11-16 RX ORDER — SENNOSIDES A AND B 8.6 MG/1
1 TABLET, FILM COATED ORAL 2 TIMES DAILY
Status: DISCONTINUED | OUTPATIENT
Start: 2024-11-16 | End: 2024-11-20 | Stop reason: HOSPADM

## 2024-11-16 RX ORDER — LANOLIN ALCOHOL/MO/W.PET/CERES
1000 CREAM (GRAM) TOPICAL DAILY
Status: DISCONTINUED | OUTPATIENT
Start: 2024-11-16 | End: 2024-11-20 | Stop reason: HOSPADM

## 2024-11-16 RX ORDER — FERROUS SULFATE 325(65) MG
325 TABLET ORAL EVERY OTHER DAY
Status: DISCONTINUED | OUTPATIENT
Start: 2024-11-16 | End: 2024-11-20 | Stop reason: HOSPADM

## 2024-11-16 RX ADMIN — METOPROLOL SUCCINATE 25 MG: 25 TABLET, FILM COATED, EXTENDED RELEASE ORAL at 10:29

## 2024-11-16 RX ADMIN — FOLIC ACID 1 MG: 1 TABLET ORAL at 17:59

## 2024-11-16 RX ADMIN — DOCUSATE SODIUM 100 MG: 100 CAPSULE, LIQUID FILLED ORAL at 10:29

## 2024-11-16 RX ADMIN — ENOXAPARIN SODIUM 30 MG: 100 INJECTION SUBCUTANEOUS at 10:28

## 2024-11-16 RX ADMIN — CEPHALEXIN 500 MG: 500 CAPSULE ORAL at 00:02

## 2024-11-16 RX ADMIN — BUMETANIDE 2 MG: 1 TABLET ORAL at 10:29

## 2024-11-16 RX ADMIN — CEPHALEXIN 500 MG: 500 CAPSULE ORAL at 12:14

## 2024-11-16 RX ADMIN — SENNOSIDES 8.6 MG: 8.6 TABLET, FILM COATED ORAL at 21:32

## 2024-11-16 RX ADMIN — CEPHALEXIN 500 MG: 500 CAPSULE ORAL at 17:59

## 2024-11-16 RX ADMIN — OXYCODONE 10 MG: 5 TABLET ORAL at 15:44

## 2024-11-16 RX ADMIN — LOSARTAN POTASSIUM 100 MG: 100 TABLET, FILM COATED ORAL at 10:29

## 2024-11-16 RX ADMIN — CEPHALEXIN 500 MG: 500 CAPSULE ORAL at 05:11

## 2024-11-16 RX ADMIN — ENOXAPARIN SODIUM 30 MG: 100 INJECTION SUBCUTANEOUS at 21:32

## 2024-11-16 RX ADMIN — FERROUS SULFATE TAB 325 MG (65 MG ELEMENTAL FE) 325 MG: 325 (65 FE) TAB at 17:59

## 2024-11-16 RX ADMIN — Medication 1 TABLET: at 21:32

## 2024-11-16 RX ADMIN — CYCLOBENZAPRINE 10 MG: 10 TABLET, FILM COATED ORAL at 00:02

## 2024-11-16 RX ADMIN — POTASSIUM CHLORIDE 10 MEQ: 750 TABLET, EXTENDED RELEASE ORAL at 10:29

## 2024-11-16 RX ADMIN — SENNOSIDES 8.6 MG: 8.6 TABLET, FILM COATED ORAL at 12:14

## 2024-11-16 RX ADMIN — ONDANSETRON 4 MG: 4 TABLET, ORALLY DISINTEGRATING ORAL at 00:02

## 2024-11-16 RX ADMIN — MAGNESIUM HYDROXIDE 30 ML: 1200 LIQUID ORAL at 12:14

## 2024-11-16 RX ADMIN — Medication 1 TABLET: at 10:29

## 2024-11-16 RX ADMIN — CEPHALEXIN 500 MG: 500 CAPSULE ORAL at 23:58

## 2024-11-16 RX ADMIN — Medication 1000 MCG: at 17:59

## 2024-11-16 RX ADMIN — CYCLOBENZAPRINE 10 MG: 10 TABLET, FILM COATED ORAL at 21:32

## 2024-11-16 RX ADMIN — MORPHINE SULFATE 2 MG: 2 INJECTION, SOLUTION INTRAMUSCULAR; INTRAVENOUS at 01:43

## 2024-11-16 RX ADMIN — ATORVASTATIN CALCIUM 40 MG: 40 TABLET, FILM COATED ORAL at 10:29

## 2024-11-16 ASSESSMENT — PAIN SCALES - GENERAL
PAINLEVEL_OUTOF10: 4
PAINLEVEL_OUTOF10: 7
PAINLEVEL_OUTOF10: 6
PAINLEVEL_OUTOF10: 7
PAINLEVEL_OUTOF10: 2

## 2024-11-16 ASSESSMENT — PAIN - FUNCTIONAL ASSESSMENT
PAIN_FUNCTIONAL_ASSESSMENT: PREVENTS OR INTERFERES SOME ACTIVE ACTIVITIES AND ADLS
PAIN_FUNCTIONAL_ASSESSMENT: ACTIVITIES ARE NOT PREVENTED
PAIN_FUNCTIONAL_ASSESSMENT: ACTIVITIES ARE NOT PREVENTED

## 2024-11-16 ASSESSMENT — PAIN DESCRIPTION - ORIENTATION
ORIENTATION: LEFT

## 2024-11-16 ASSESSMENT — PAIN DESCRIPTION - DESCRIPTORS
DESCRIPTORS: DISCOMFORT
DESCRIPTORS: ACHING
DESCRIPTORS: ACHING
DESCRIPTORS: DISCOMFORT

## 2024-11-16 ASSESSMENT — PAIN DESCRIPTION - FREQUENCY
FREQUENCY: INTERMITTENT
FREQUENCY: INTERMITTENT

## 2024-11-16 ASSESSMENT — PAIN DESCRIPTION - ONSET
ONSET: ON-GOING
ONSET: ON-GOING

## 2024-11-16 ASSESSMENT — PAIN DESCRIPTION - LOCATION
LOCATION: HIP
LOCATION: HIP;LEG
LOCATION: HIP
LOCATION: HIP;LEG

## 2024-11-16 ASSESSMENT — PAIN DESCRIPTION - PAIN TYPE
TYPE: ACUTE PAIN
TYPE: ACUTE PAIN

## 2024-11-17 LAB
ANION GAP SERPL CALC-SCNC: 11 MEQ/L (ref 8–16)
BACTERIA UR CULT: NORMAL
BUN SERPL-MCNC: 27 MG/DL (ref 7–22)
CALCIUM SERPL-MCNC: 9 MG/DL (ref 8.5–10.5)
CHLORIDE SERPL-SCNC: 101 MEQ/L (ref 98–111)
CO2 SERPL-SCNC: 27 MEQ/L (ref 23–33)
CREAT SERPL-MCNC: 1 MG/DL (ref 0.4–1.2)
DEPRECATED RDW RBC AUTO: 56.7 FL (ref 35–45)
ERYTHROCYTE [DISTWIDTH] IN BLOOD BY AUTOMATED COUNT: 14.8 % (ref 11.5–14.5)
GFR SERPL CREATININE-BSD FRML MDRD: 78 ML/MIN/1.73M2
GLUCOSE SERPL-MCNC: 107 MG/DL (ref 70–108)
HCT VFR BLD AUTO: 29.1 % (ref 42–52)
HGB BLD-MCNC: 10.3 GM/DL (ref 14–18)
MCH RBC QN AUTO: 37.1 PG (ref 26–33)
MCHC RBC AUTO-ENTMCNC: 35.4 GM/DL (ref 32.2–35.5)
MCV RBC AUTO: 104.7 FL (ref 80–94)
PLATELET # BLD AUTO: 149 THOU/MM3 (ref 130–400)
PMV BLD AUTO: 10.3 FL (ref 9.4–12.4)
POTASSIUM SERPL-SCNC: 4 MEQ/L (ref 3.5–5.2)
RBC # BLD AUTO: 2.78 MILL/MM3 (ref 4.7–6.1)
SODIUM SERPL-SCNC: 139 MEQ/L (ref 135–145)
WBC # BLD AUTO: 3.9 THOU/MM3 (ref 4.8–10.8)

## 2024-11-17 PROCEDURE — 36415 COLL VENOUS BLD VENIPUNCTURE: CPT

## 2024-11-17 PROCEDURE — 6360000002 HC RX W HCPCS: Performed by: PHYSICIAN ASSISTANT

## 2024-11-17 PROCEDURE — 6370000000 HC RX 637 (ALT 250 FOR IP): Performed by: STUDENT IN AN ORGANIZED HEALTH CARE EDUCATION/TRAINING PROGRAM

## 2024-11-17 PROCEDURE — 6370000000 HC RX 637 (ALT 250 FOR IP): Performed by: PHYSICIAN ASSISTANT

## 2024-11-17 PROCEDURE — 6370000000 HC RX 637 (ALT 250 FOR IP)

## 2024-11-17 PROCEDURE — 6360000002 HC RX W HCPCS

## 2024-11-17 PROCEDURE — 1200000000 HC SEMI PRIVATE

## 2024-11-17 PROCEDURE — 80048 BASIC METABOLIC PNL TOTAL CA: CPT

## 2024-11-17 PROCEDURE — 99232 SBSQ HOSP IP/OBS MODERATE 35: CPT | Performed by: STUDENT IN AN ORGANIZED HEALTH CARE EDUCATION/TRAINING PROGRAM

## 2024-11-17 PROCEDURE — 85027 COMPLETE CBC AUTOMATED: CPT

## 2024-11-17 RX ADMIN — CEPHALEXIN 500 MG: 500 CAPSULE ORAL at 11:49

## 2024-11-17 RX ADMIN — OXYCODONE 10 MG: 5 TABLET ORAL at 21:01

## 2024-11-17 RX ADMIN — OXYCODONE 10 MG: 5 TABLET ORAL at 01:12

## 2024-11-17 RX ADMIN — Medication 1 TABLET: at 09:13

## 2024-11-17 RX ADMIN — LOSARTAN POTASSIUM 100 MG: 100 TABLET, FILM COATED ORAL at 09:14

## 2024-11-17 RX ADMIN — Medication 1 TABLET: at 21:01

## 2024-11-17 RX ADMIN — MAGNESIUM HYDROXIDE 30 ML: 1200 LIQUID ORAL at 09:47

## 2024-11-17 RX ADMIN — FOLIC ACID 1 MG: 1 TABLET ORAL at 09:13

## 2024-11-17 RX ADMIN — Medication 1000 MCG: at 09:14

## 2024-11-17 RX ADMIN — HYDRALAZINE HYDROCHLORIDE 5 MG: 20 INJECTION, SOLUTION INTRAMUSCULAR; INTRAVENOUS at 18:22

## 2024-11-17 RX ADMIN — BUMETANIDE 2 MG: 1 TABLET ORAL at 09:14

## 2024-11-17 RX ADMIN — CEPHALEXIN 500 MG: 500 CAPSULE ORAL at 18:17

## 2024-11-17 RX ADMIN — ENOXAPARIN SODIUM 30 MG: 100 INJECTION SUBCUTANEOUS at 21:01

## 2024-11-17 RX ADMIN — CEPHALEXIN 500 MG: 500 CAPSULE ORAL at 05:40

## 2024-11-17 RX ADMIN — SENNOSIDES 8.6 MG: 8.6 TABLET, FILM COATED ORAL at 21:01

## 2024-11-17 RX ADMIN — DOCUSATE SODIUM 100 MG: 100 CAPSULE, LIQUID FILLED ORAL at 09:44

## 2024-11-17 RX ADMIN — SENNOSIDES 8.6 MG: 8.6 TABLET, FILM COATED ORAL at 09:44

## 2024-11-17 RX ADMIN — METOPROLOL SUCCINATE 25 MG: 25 TABLET, FILM COATED, EXTENDED RELEASE ORAL at 09:13

## 2024-11-17 RX ADMIN — ENOXAPARIN SODIUM 30 MG: 100 INJECTION SUBCUTANEOUS at 09:44

## 2024-11-17 RX ADMIN — ATORVASTATIN CALCIUM 40 MG: 40 TABLET, FILM COATED ORAL at 09:15

## 2024-11-17 RX ADMIN — POTASSIUM CHLORIDE 10 MEQ: 750 TABLET, EXTENDED RELEASE ORAL at 09:47

## 2024-11-17 RX ADMIN — CEPHALEXIN 500 MG: 500 CAPSULE ORAL at 23:48

## 2024-11-17 ASSESSMENT — PAIN DESCRIPTION - ORIENTATION
ORIENTATION: LEFT
ORIENTATION: LEFT

## 2024-11-17 ASSESSMENT — PAIN DESCRIPTION - DESCRIPTORS
DESCRIPTORS: ACHING;DISCOMFORT
DESCRIPTORS: ACHING;DISCOMFORT

## 2024-11-17 ASSESSMENT — PAIN DESCRIPTION - LOCATION
LOCATION: HIP
LOCATION: HIP

## 2024-11-17 ASSESSMENT — PAIN - FUNCTIONAL ASSESSMENT
PAIN_FUNCTIONAL_ASSESSMENT: PREVENTS OR INTERFERES SOME ACTIVE ACTIVITIES AND ADLS
PAIN_FUNCTIONAL_ASSESSMENT: PREVENTS OR INTERFERES SOME ACTIVE ACTIVITIES AND ADLS

## 2024-11-17 ASSESSMENT — PAIN SCALES - GENERAL
PAINLEVEL_OUTOF10: 7
PAINLEVEL_OUTOF10: 7

## 2024-11-17 ASSESSMENT — PAIN DESCRIPTION - PAIN TYPE
TYPE: ACUTE PAIN
TYPE: ACUTE PAIN

## 2024-11-17 ASSESSMENT — PAIN DESCRIPTION - ONSET
ONSET: ON-GOING
ONSET: ON-GOING

## 2024-11-17 ASSESSMENT — PAIN DESCRIPTION - FREQUENCY
FREQUENCY: INTERMITTENT
FREQUENCY: INTERMITTENT

## 2024-11-18 LAB
ANION GAP SERPL CALC-SCNC: 15 MEQ/L (ref 8–16)
BUN SERPL-MCNC: 29 MG/DL (ref 7–22)
CALCIUM SERPL-MCNC: 9.3 MG/DL (ref 8.5–10.5)
CHLORIDE SERPL-SCNC: 101 MEQ/L (ref 98–111)
CO2 SERPL-SCNC: 25 MEQ/L (ref 23–33)
CREAT SERPL-MCNC: 1.1 MG/DL (ref 0.4–1.2)
DEPRECATED RDW RBC AUTO: 55.1 FL (ref 35–45)
ERYTHROCYTE [DISTWIDTH] IN BLOOD BY AUTOMATED COUNT: 14.8 % (ref 11.5–14.5)
GFR SERPL CREATININE-BSD FRML MDRD: 70 ML/MIN/1.73M2
GLUCOSE SERPL-MCNC: 118 MG/DL (ref 70–108)
HCT VFR BLD AUTO: 31.3 % (ref 42–52)
HGB BLD-MCNC: 11.1 GM/DL (ref 14–18)
MCH RBC QN AUTO: 36.2 PG (ref 26–33)
MCHC RBC AUTO-ENTMCNC: 35.5 GM/DL (ref 32.2–35.5)
MCV RBC AUTO: 102 FL (ref 80–94)
PLATELET # BLD AUTO: 156 THOU/MM3 (ref 130–400)
PMV BLD AUTO: 9.7 FL (ref 9.4–12.4)
POTASSIUM SERPL-SCNC: 3.5 MEQ/L (ref 3.5–5.2)
RBC # BLD AUTO: 3.07 MILL/MM3 (ref 4.7–6.1)
SODIUM SERPL-SCNC: 141 MEQ/L (ref 135–145)
WBC # BLD AUTO: 5 THOU/MM3 (ref 4.8–10.8)

## 2024-11-18 PROCEDURE — 36415 COLL VENOUS BLD VENIPUNCTURE: CPT

## 2024-11-18 PROCEDURE — 97530 THERAPEUTIC ACTIVITIES: CPT

## 2024-11-18 PROCEDURE — 6370000000 HC RX 637 (ALT 250 FOR IP): Performed by: PHYSICIAN ASSISTANT

## 2024-11-18 PROCEDURE — 6370000000 HC RX 637 (ALT 250 FOR IP): Performed by: STUDENT IN AN ORGANIZED HEALTH CARE EDUCATION/TRAINING PROGRAM

## 2024-11-18 PROCEDURE — 6360000002 HC RX W HCPCS

## 2024-11-18 PROCEDURE — 6370000000 HC RX 637 (ALT 250 FOR IP)

## 2024-11-18 PROCEDURE — 1200000000 HC SEMI PRIVATE

## 2024-11-18 PROCEDURE — 85027 COMPLETE CBC AUTOMATED: CPT

## 2024-11-18 PROCEDURE — 99232 SBSQ HOSP IP/OBS MODERATE 35: CPT | Performed by: PHYSICIAN ASSISTANT

## 2024-11-18 PROCEDURE — 6360000002 HC RX W HCPCS: Performed by: PHYSICIAN ASSISTANT

## 2024-11-18 PROCEDURE — 80048 BASIC METABOLIC PNL TOTAL CA: CPT

## 2024-11-18 PROCEDURE — 97535 SELF CARE MNGMENT TRAINING: CPT

## 2024-11-18 PROCEDURE — 97110 THERAPEUTIC EXERCISES: CPT

## 2024-11-18 RX ORDER — FERROUS SULFATE 325(65) MG
325 TABLET ORAL EVERY OTHER DAY
Qty: 30 TABLET | Refills: 0 | Status: SHIPPED | OUTPATIENT
Start: 2024-11-18

## 2024-11-18 RX ORDER — LIDOCAINE 4 G/G
3 PATCH TOPICAL DAILY
Status: DISCONTINUED | OUTPATIENT
Start: 2024-11-18 | End: 2024-11-20 | Stop reason: HOSPADM

## 2024-11-18 RX ORDER — ENOXAPARIN SODIUM 100 MG/ML
30 INJECTION SUBCUTANEOUS 2 TIMES DAILY
Qty: 12.6 ML | Refills: 0 | Status: SHIPPED | OUTPATIENT
Start: 2024-11-18 | End: 2024-12-09

## 2024-11-18 RX ORDER — CYCLOBENZAPRINE HCL 10 MG
10 TABLET ORAL 3 TIMES DAILY PRN
Qty: 30 TABLET | Refills: 0 | Status: SHIPPED | OUTPATIENT
Start: 2024-11-18 | End: 2024-11-28

## 2024-11-18 RX ORDER — OXYCODONE HYDROCHLORIDE 5 MG/1
5 TABLET ORAL EVERY 6 HOURS PRN
Qty: 28 TABLET | Refills: 0 | Status: SHIPPED | OUTPATIENT
Start: 2024-11-18 | End: 2024-11-25

## 2024-11-18 RX ORDER — LOSARTAN POTASSIUM 100 MG/1
100 TABLET ORAL NIGHTLY
Status: DISCONTINUED | OUTPATIENT
Start: 2024-11-18 | End: 2024-11-20 | Stop reason: HOSPADM

## 2024-11-18 RX ORDER — PSEUDOEPHEDRINE HCL 30 MG
100 TABLET ORAL DAILY
Qty: 20 CAPSULE | Refills: 0 | Status: SHIPPED | OUTPATIENT
Start: 2024-11-18 | End: 2024-12-08

## 2024-11-18 RX ORDER — FOLIC ACID 1 MG/1
1 TABLET ORAL DAILY
Qty: 30 TABLET | Refills: 0 | Status: SHIPPED | OUTPATIENT
Start: 2024-11-18

## 2024-11-18 RX ORDER — ACETAMINOPHEN 500 MG
1000 TABLET ORAL EVERY 6 HOURS PRN
Status: DISCONTINUED | OUTPATIENT
Start: 2024-11-18 | End: 2024-11-20 | Stop reason: HOSPADM

## 2024-11-18 RX ORDER — QUETIAPINE FUMARATE 25 MG/1
50 TABLET, FILM COATED ORAL NIGHTLY
Status: COMPLETED | OUTPATIENT
Start: 2024-11-18 | End: 2024-11-18

## 2024-11-18 RX ORDER — HALOPERIDOL 5 MG/ML
5 INJECTION INTRAMUSCULAR ONCE
Status: COMPLETED | OUTPATIENT
Start: 2024-11-18 | End: 2024-11-18

## 2024-11-18 RX ADMIN — CEPHALEXIN 500 MG: 500 CAPSULE ORAL at 13:18

## 2024-11-18 RX ADMIN — FERROUS SULFATE TAB 325 MG (65 MG ELEMENTAL FE) 325 MG: 325 (65 FE) TAB at 08:52

## 2024-11-18 RX ADMIN — Medication 1 TABLET: at 20:13

## 2024-11-18 RX ADMIN — LOSARTAN POTASSIUM 100 MG: 100 TABLET, FILM COATED ORAL at 20:11

## 2024-11-18 RX ADMIN — LOSARTAN POTASSIUM 100 MG: 100 TABLET, FILM COATED ORAL at 08:52

## 2024-11-18 RX ADMIN — DOCUSATE SODIUM 100 MG: 100 CAPSULE, LIQUID FILLED ORAL at 08:52

## 2024-11-18 RX ADMIN — Medication 1000 MCG: at 08:53

## 2024-11-18 RX ADMIN — ENOXAPARIN SODIUM 30 MG: 100 INJECTION SUBCUTANEOUS at 08:53

## 2024-11-18 RX ADMIN — Medication 1 TABLET: at 08:53

## 2024-11-18 RX ADMIN — SENNOSIDES 8.6 MG: 8.6 TABLET, FILM COATED ORAL at 08:53

## 2024-11-18 RX ADMIN — ATORVASTATIN CALCIUM 40 MG: 40 TABLET, FILM COATED ORAL at 08:52

## 2024-11-18 RX ADMIN — BUMETANIDE 2 MG: 1 TABLET ORAL at 08:52

## 2024-11-18 RX ADMIN — QUETIAPINE FUMARATE 50 MG: 25 TABLET ORAL at 20:10

## 2024-11-18 RX ADMIN — CEPHALEXIN 500 MG: 500 CAPSULE ORAL at 18:07

## 2024-11-18 RX ADMIN — CEPHALEXIN 500 MG: 500 CAPSULE ORAL at 05:18

## 2024-11-18 RX ADMIN — MAGNESIUM HYDROXIDE 30 ML: 1200 LIQUID ORAL at 08:53

## 2024-11-18 RX ADMIN — SENNOSIDES 8.6 MG: 8.6 TABLET, FILM COATED ORAL at 20:12

## 2024-11-18 RX ADMIN — POTASSIUM CHLORIDE 10 MEQ: 750 TABLET, EXTENDED RELEASE ORAL at 08:53

## 2024-11-18 RX ADMIN — ENOXAPARIN SODIUM 30 MG: 100 INJECTION SUBCUTANEOUS at 20:10

## 2024-11-18 RX ADMIN — HALOPERIDOL LACTATE 5 MG: 5 INJECTION, SOLUTION INTRAMUSCULAR at 02:35

## 2024-11-18 RX ADMIN — FOLIC ACID 1 MG: 1 TABLET ORAL at 08:52

## 2024-11-18 ASSESSMENT — PAIN DESCRIPTION - PAIN TYPE: TYPE: ACUTE PAIN

## 2024-11-18 ASSESSMENT — PAIN SCALES - GENERAL
PAINLEVEL_OUTOF10: 3
PAINLEVEL_OUTOF10: 0

## 2024-11-18 ASSESSMENT — PAIN DESCRIPTION - LOCATION: LOCATION: HIP

## 2024-11-18 ASSESSMENT — PAIN DESCRIPTION - ORIENTATION: ORIENTATION: LEFT

## 2024-11-18 ASSESSMENT — PAIN DESCRIPTION - DESCRIPTORS: DESCRIPTORS: ACHING

## 2024-11-18 ASSESSMENT — PAIN - FUNCTIONAL ASSESSMENT: PAIN_FUNCTIONAL_ASSESSMENT: ACTIVITIES ARE NOT PREVENTED

## 2024-11-18 NOTE — DISCHARGE INSTR - COC
Continuity of Care Form    Patient Name: Marty Basilio   :  1950  MRN:  [id number]    Admit date:  2024  Discharge date:  2024    Code Status Order: Full Code   Advance Directives:   Advance Care Flowsheet Documentation             Admitting Physician:  Luís Huizar DO  PCP: Alondra Pettit, AKANKSHA - CNP    Discharging Nurse: Niharika Barbosa RN  Discharging Hospital Unit/Room#: 7K-19/019-A  Discharging Unit Phone Number: [phone]    Emergency Contact:   Extended Emergency Contact Information  Primary Emergency Contact: Austyn Frederick  Home Phone: [phone]  Mobile Phone: [phone]  Relation: Brother/Sister  Secondary Emergency Contact: Vicente Krishnan  Mobile Phone: [phone]  Relation: Other  Preferred language: English   needed? No    Past Surgical History:  Past Surgical History:   Procedure Laterality Date    ACHILLES TENDON SURGERY Right 1985    CARDIAC CATHETERIZATION  [id number]    NECK SURGERY      Lumpectomy    TOTAL HIP ARTHROPLASTY Right 2016       Immunization History:   Immunization History   Administered Date(s) Administered    COVID-19, MODERNA Bivalent, (age 12y+), IM, 50 mcg/0.5 mL 2022    COVID-19, MODERNA, (age 12y+), IM, 50mcg/0.5mL 2023    Influenza, FLUZONE High Dose, (age 65 y+), IM, Trivalent PF, 0.5mL 2017       Active Problems:  Patient Active Problem List   Diagnosis Code    Hypertension I10    Hyperlipidemia E78.5    CAD (coronary artery disease) I25.10    Obesity E66.9    Atypical chest pain R07.89    Pedal edema R60.0    Periprosthetic fracture around internal prosthetic left hip joint, initial encounter ([facility]) M97.02XA       Isolation/Infection:   Isolation            No Isolation          Patient Infection Status       None to display            Nurse Assessment:  Last Vital Signs: BP (!) 157/69   Pulse 60   Temp 97.9 °F (36.6 °C) (Oral)   Resp 17   Ht 1.854 m (6' 1\")   Wt 105.3 kg (232 lb 2.3 oz)   SpO2 97%

## 2024-11-18 NOTE — CARE COORDINATION
11/18/24, 1:31 PM EST    DISCHARGE PLANNING EVALUATION       Spoke with Ronak at [facility] and they accepted patient and will start the pre-cert attempted to updated sister no answer and generic voicemail.

## 2024-11-18 NOTE — CARE COORDINATION
-Discussed weight loss, keeping area dry and to prevent moisture, Rx nystatin to use as directed   11/18/24, 8:58 AM EST    DISCHARGE PLANNING EVALUATION       Spoke with patient and discussed ECF options.  He stated that he wants one close to his home.  After reviewing in network options and star ratings patient prefers [facility].  Called and made a referral to Ronak at [facility].     Spoke with patient's sister Austyn and discussed ECF options.  1. [facility], 2. [facility], 3. [facility]

## 2024-11-18 NOTE — CARE COORDINATION
11/18/24, 2:53 PM EST    DISCHARGE PLANNING EVALUATION     Updated patient's sister for acceptance for [facility].

## 2024-11-19 PROCEDURE — 6370000000 HC RX 637 (ALT 250 FOR IP): Performed by: STUDENT IN AN ORGANIZED HEALTH CARE EDUCATION/TRAINING PROGRAM

## 2024-11-19 PROCEDURE — 6370000000 HC RX 637 (ALT 250 FOR IP): Performed by: PHYSICIAN ASSISTANT

## 2024-11-19 PROCEDURE — 97535 SELF CARE MNGMENT TRAINING: CPT

## 2024-11-19 PROCEDURE — 6360000002 HC RX W HCPCS: Performed by: PHYSICIAN ASSISTANT

## 2024-11-19 PROCEDURE — 97530 THERAPEUTIC ACTIVITIES: CPT

## 2024-11-19 PROCEDURE — 97110 THERAPEUTIC EXERCISES: CPT

## 2024-11-19 PROCEDURE — 1200000000 HC SEMI PRIVATE

## 2024-11-19 PROCEDURE — 6370000000 HC RX 637 (ALT 250 FOR IP)

## 2024-11-19 PROCEDURE — 99232 SBSQ HOSP IP/OBS MODERATE 35: CPT | Performed by: PHYSICIAN ASSISTANT

## 2024-11-19 RX ORDER — QUETIAPINE FUMARATE 25 MG/1
50 TABLET, FILM COATED ORAL NIGHTLY
Status: COMPLETED | OUTPATIENT
Start: 2024-11-19 | End: 2024-11-19

## 2024-11-19 RX ADMIN — BUMETANIDE 2 MG: 1 TABLET ORAL at 09:02

## 2024-11-19 RX ADMIN — CEPHALEXIN 500 MG: 500 CAPSULE ORAL at 00:15

## 2024-11-19 RX ADMIN — FOLIC ACID 1 MG: 1 TABLET ORAL at 09:03

## 2024-11-19 RX ADMIN — Medication 1 TABLET: at 09:03

## 2024-11-19 RX ADMIN — CEPHALEXIN 500 MG: 500 CAPSULE ORAL at 11:06

## 2024-11-19 RX ADMIN — CEPHALEXIN 500 MG: 500 CAPSULE ORAL at 17:23

## 2024-11-19 RX ADMIN — SENNOSIDES 8.6 MG: 8.6 TABLET, FILM COATED ORAL at 20:09

## 2024-11-19 RX ADMIN — DOCUSATE SODIUM 100 MG: 100 CAPSULE, LIQUID FILLED ORAL at 09:01

## 2024-11-19 RX ADMIN — Medication 1000 MCG: at 09:03

## 2024-11-19 RX ADMIN — CEPHALEXIN 500 MG: 500 CAPSULE ORAL at 05:21

## 2024-11-19 RX ADMIN — ATORVASTATIN CALCIUM 40 MG: 40 TABLET, FILM COATED ORAL at 09:02

## 2024-11-19 RX ADMIN — POTASSIUM CHLORIDE 10 MEQ: 750 TABLET, EXTENDED RELEASE ORAL at 09:01

## 2024-11-19 RX ADMIN — MAGNESIUM HYDROXIDE 30 ML: 1200 LIQUID ORAL at 09:01

## 2024-11-19 RX ADMIN — QUETIAPINE FUMARATE 50 MG: 25 TABLET ORAL at 20:10

## 2024-11-19 RX ADMIN — LOSARTAN POTASSIUM 100 MG: 100 TABLET, FILM COATED ORAL at 20:08

## 2024-11-19 RX ADMIN — Medication 1 TABLET: at 20:08

## 2024-11-19 RX ADMIN — SENNOSIDES 8.6 MG: 8.6 TABLET, FILM COATED ORAL at 09:01

## 2024-11-19 RX ADMIN — ENOXAPARIN SODIUM 30 MG: 100 INJECTION SUBCUTANEOUS at 20:08

## 2024-11-19 RX ADMIN — ENOXAPARIN SODIUM 30 MG: 100 INJECTION SUBCUTANEOUS at 09:01

## 2024-11-19 ASSESSMENT — PATIENT HEALTH QUESTIONNAIRE - PHQ9
SUM OF ALL RESPONSES TO PHQ QUESTIONS 1-9: 0
SUM OF ALL RESPONSES TO PHQ QUESTIONS 1-9: 0
SUM OF ALL RESPONSES TO PHQ9 QUESTIONS 1 & 2: 0
SUM OF ALL RESPONSES TO PHQ QUESTIONS 1-9: 0
1. LITTLE INTEREST OR PLEASURE IN DOING THINGS: NOT AT ALL
2. FEELING DOWN, DEPRESSED OR HOPELESS: NOT AT ALL
SUM OF ALL RESPONSES TO PHQ QUESTIONS 1-9: 0

## 2024-11-19 ASSESSMENT — LIFESTYLE VARIABLES
HOW OFTEN DO YOU HAVE A DRINK CONTAINING ALCOHOL: NEVER
HOW MANY STANDARD DRINKS CONTAINING ALCOHOL DO YOU HAVE ON A TYPICAL DAY: PATIENT DOES NOT DRINK

## 2024-11-19 NOTE — CARE COORDINATION
11/19/24, 3:03 PM EST    DISCHARGE ON GOING EVALUATION    [facility] day: 7  Location: -19/019-A Reason for admit: Periprosthetic fracture around internal prosthetic left hip joint, initial encounter ([facility]) [M97.02XA]     Procedures:   11/13 Echo: ef 65%  11/13 LE doppler: negative for dvt    Imaging since last note: none    Barriers to Discharge: Telesitter removed this morning, must be out 24hrs prior to discharging from SNF. Await precert. Continues working with therapy. Pain control.     PCP: Alondra Pettit APRN - CNP  Readmission Risk Score: 13.2    Patient Goals/Plan/Treatment Preferences: Await precert for [facility]

## 2024-11-19 NOTE — DISCHARGE INSTRUCTIONS
Dr Huizar  Orthopedic Instructions:  -Weight bearing status: 50% weight bearing to the left leg while using walker   -Physical Therapy for strengthening and gait training. Occupational therapy for activities of daily living.  -Ice (20 minutes on and off 1 hour) and elevate (above heart) as needed for swelling/pain  -Drink plenty of fluids.  -Take medications as prescribed.  -Wean off narcotics (percocet/norco) as soon as possible. Do not take tylenol if still taking narcotics.  -Take anticoagulation as prescribed   -No alcoholic beverages or driving/operating machinery while on narcotics  -Follow up with  in his office in 14 days after surgery/discharge.

## 2024-11-20 VITALS
OXYGEN SATURATION: 99 % | RESPIRATION RATE: 18 BRPM | HEIGHT: 73 IN | HEART RATE: 50 BPM | SYSTOLIC BLOOD PRESSURE: 139 MMHG | TEMPERATURE: 97.6 F | WEIGHT: 232.14 LBS | DIASTOLIC BLOOD PRESSURE: 60 MMHG | BODY MASS INDEX: 30.77 KG/M2

## 2024-11-20 PROCEDURE — 6360000002 HC RX W HCPCS: Performed by: PHYSICIAN ASSISTANT

## 2024-11-20 PROCEDURE — 6370000000 HC RX 637 (ALT 250 FOR IP): Performed by: PHYSICIAN ASSISTANT

## 2024-11-20 PROCEDURE — 6370000000 HC RX 637 (ALT 250 FOR IP): Performed by: STUDENT IN AN ORGANIZED HEALTH CARE EDUCATION/TRAINING PROGRAM

## 2024-11-20 PROCEDURE — 6370000000 HC RX 637 (ALT 250 FOR IP)

## 2024-11-20 RX ADMIN — POTASSIUM CHLORIDE 10 MEQ: 750 TABLET, EXTENDED RELEASE ORAL at 08:11

## 2024-11-20 RX ADMIN — DOCUSATE SODIUM 100 MG: 100 CAPSULE, LIQUID FILLED ORAL at 08:11

## 2024-11-20 RX ADMIN — MAGNESIUM HYDROXIDE 30 ML: 1200 LIQUID ORAL at 08:12

## 2024-11-20 RX ADMIN — SENNOSIDES 8.6 MG: 8.6 TABLET, FILM COATED ORAL at 08:11

## 2024-11-20 RX ADMIN — ATORVASTATIN CALCIUM 40 MG: 40 TABLET, FILM COATED ORAL at 08:12

## 2024-11-20 RX ADMIN — Medication 1 TABLET: at 08:11

## 2024-11-20 RX ADMIN — FERROUS SULFATE TAB 325 MG (65 MG ELEMENTAL FE) 325 MG: 325 (65 FE) TAB at 08:12

## 2024-11-20 RX ADMIN — BUMETANIDE 2 MG: 1 TABLET ORAL at 08:12

## 2024-11-20 RX ADMIN — Medication 1000 MCG: at 08:12

## 2024-11-20 RX ADMIN — ENOXAPARIN SODIUM 30 MG: 100 INJECTION SUBCUTANEOUS at 09:00

## 2024-11-20 RX ADMIN — FOLIC ACID 1 MG: 1 TABLET ORAL at 08:12

## 2024-11-20 RX ADMIN — CEPHALEXIN 500 MG: 500 CAPSULE ORAL at 00:47

## 2024-11-20 NOTE — PLAN OF CARE
Problem: Discharge Planning  Goal: Discharge to home or other facility with appropriate resources  11/14/2024 1659 by Shonda Hogue, RN  Outcome: Progressing  Flowsheets (Taken 11/14/2024 1659)  Discharge to home or other facility with appropriate resources: Identify barriers to discharge with patient and caregiver  11/14/2024 1445 by Shyanne Antonio LSW  Outcome: Progressing     Problem: Safety - Adult  Goal: Free from fall injury  Outcome: Progressing  Flowsheets (Taken 11/14/2024 1659)  Free From Fall Injury: Instruct family/caregiver on patient safety     Problem: Pain  Goal: Verbalizes/displays adequate comfort level or baseline comfort level  Outcome: Progressing  Flowsheets (Taken 11/14/2024 1659)  Verbalizes/displays adequate comfort level or baseline comfort level: Assess pain using appropriate pain scale     Problem: Skin/Tissue Integrity  Goal: Absence of new skin breakdown  Description: 1.  Monitor for areas of redness and/or skin breakdown  2.  Assess vascular access sites hourly  3.  Every 4-6 hours minimum:  Change oxygen saturation probe site  4.  Every 4-6 hours:  If on nasal continuous positive airway pressure, respiratory therapy assess nares and determine need for appliance change or resting period.  Outcome: Progressing   Care plan reviewed with patient.  Patient verbalize understanding of the plan of care and contribute to goal setting.     
  Problem: Discharge Planning  Goal: Discharge to home or other facility with appropriate resources  11/14/2024 2117 by Giovanni Salvador RN  Outcome: Progressing  11/14/2024 1659 by Shonda Hogue RN  Outcome: Progressing  Flowsheets (Taken 11/14/2024 1659)  Discharge to home or other facility with appropriate resources: Identify barriers to discharge with patient and caregiver  11/14/2024 1445 by Shyanne Antonio LSW  Outcome: Progressing     Problem: Safety - Adult  Goal: Free from fall injury  11/14/2024 2117 by Giovanni Salvador RN  Outcome: Progressing  11/14/2024 1659 by Shonda Hogue RN  Outcome: Progressing  Flowsheets (Taken 11/14/2024 1659)  Free From Fall Injury: Instruct family/caregiver on patient safety     Problem: Pain  Goal: Verbalizes/displays adequate comfort level or baseline comfort level  11/14/2024 2117 by Giovanni Salvador RN  Outcome: Progressing  11/14/2024 1659 by Shonda Hogue RN  Outcome: Progressing  Flowsheets (Taken 11/14/2024 1659)  Verbalizes/displays adequate comfort level or baseline comfort level: Assess pain using appropriate pain scale     Problem: Skin/Tissue Integrity  Goal: Absence of new skin breakdown  Description: 1.  Monitor for areas of redness and/or skin breakdown  2.  Assess vascular access sites hourly  3.  Every 4-6 hours minimum:  Change oxygen saturation probe site  4.  Every 4-6 hours:  If on nasal continuous positive airway pressure, respiratory therapy assess nares and determine need for appliance change or resting period.  11/14/2024 1659 by Shonda Hogue RN  Outcome: Progressing     
  Problem: Discharge Planning  Goal: Discharge to home or other facility with appropriate resources  11/16/2024 0324 by Rubi Davis RN  Outcome: Progressing  Flowsheets (Taken 11/15/2024 2049)  Discharge to home or other facility with appropriate resources:   Identify barriers to discharge with patient and caregiver   Arrange for needed discharge resources and transportation as appropriate   Identify discharge learning needs (meds, wound care, etc)   Refer to discharge planning if patient needs post-hospital services based on physician order or complex needs related to functional status, cognitive ability or social support system  11/15/2024 1611 by Shonda Hogue RN  Outcome: Progressing  Flowsheets (Taken 11/15/2024 1611)  Discharge to home or other facility with appropriate resources: Identify barriers to discharge with patient and caregiver     Problem: Safety - Adult  Goal: Free from fall injury  11/16/2024 0324 by Rubi Davis RN  Outcome: Progressing  Flowsheets (Taken 11/15/2024 1611 by Shonda Hogue RN)  Free From Fall Injury: Instruct family/caregiver on patient safety  11/15/2024 1611 by Shonda Hogue RN  Outcome: Progressing  Flowsheets (Taken 11/15/2024 1611)  Free From Fall Injury: Instruct family/caregiver on patient safety     Problem: Pain  Goal: Verbalizes/displays adequate comfort level or baseline comfort level  11/16/2024 0324 by Rubi Davis RN  Outcome: Progressing  Flowsheets (Taken 11/15/2024 2049)  Verbalizes/displays adequate comfort level or baseline comfort level:   Encourage patient to monitor pain and request assistance   Assess pain using appropriate pain scale   Administer analgesics based on type and severity of pain and evaluate response   Implement non-pharmacological measures as appropriate and evaluate response   Consider cultural and social influences on pain and pain management   Notify Licensed Independent Practitioner if interventions unsuccessful or patient 
  Problem: Discharge Planning  Goal: Discharge to home or other facility with appropriate resources  11/17/2024 1032 by Faith Brunson RN  Outcome: Progressing  Flowsheets (Taken 11/16/2024 2129 by Rubi Davis RN)  Discharge to home or other facility with appropriate resources:   Identify barriers to discharge with patient and caregiver   Arrange for needed discharge resources and transportation as appropriate   Identify discharge learning needs (meds, wound care, etc)   Refer to discharge planning if patient needs post-hospital services based on physician order or complex needs related to functional status, cognitive ability or social support system     Problem: Safety - Adult  Goal: Free from fall injury  11/17/2024 1032 by Faith Brunson RN  Outcome: Progressing  Flowsheets (Taken 11/15/2024 1611 by Shonda Hogue RN)  Free From Fall Injury: Instruct family/caregiver on patient safety     Problem: Pain  Goal: Verbalizes/displays adequate comfort level or baseline comfort level  11/17/2024 1032 by Faith Brunson RN  Outcome: Progressing  Flowsheets (Taken 11/16/2024 2129 by Rubi Davis RN)  Verbalizes/displays adequate comfort level or baseline comfort level:   Encourage patient to monitor pain and request assistance   Assess pain using appropriate pain scale   Administer analgesics based on type and severity of pain and evaluate response   Implement non-pharmacological measures as appropriate and evaluate response   Consider cultural and social influences on pain and pain management   Notify Licensed Independent Practitioner if interventions unsuccessful or patient reports new pain     Problem: Skin/Tissue Integrity  Goal: Absence of new skin breakdown  Description: 1.  Monitor for areas of redness and/or skin breakdown  2.  Assess vascular access sites hourly  3.  Every 4-6 hours minimum:  Change oxygen saturation probe site  4.  Every 4-6 hours:  If on nasal continuous positive airway pressure, 
  Problem: Discharge Planning  Goal: Discharge to home or other facility with appropriate resources  11/18/2024 1553 by Dee Rutledge RN  Outcome: Progressing  Flowsheets (Taken 11/18/2024 1553)  Discharge to home or other facility with appropriate resources: Identify barriers to discharge with patient and caregiver     Problem: Safety - Adult  Goal: Free from fall injury  11/18/2024 1553 by Dee Rutledge RN  Outcome: Progressing  Flowsheets (Taken 11/18/2024 1553)  Free From Fall Injury: Instruct family/caregiver on patient safety     Problem: Pain  Goal: Verbalizes/displays adequate comfort level or baseline comfort level  11/18/2024 1553 by Dee Rutledge RN  Outcome: Progressing  Flowsheets (Taken 11/18/2024 1553)  Verbalizes/displays adequate comfort level or baseline comfort level: Encourage patient to monitor pain and request assistance     Problem: Skin/Tissue Integrity  Goal: Absence of new skin breakdown  Description: 1.  Monitor for areas of redness and/or skin breakdown  2.  Assess vascular access sites hourly  3.  Every 4-6 hours minimum:  Change oxygen saturation probe site  4.  Every 4-6 hours:  If on nasal continuous positive airway pressure, respiratory therapy assess nares and determine need for appliance change or resting period.  11/18/2024 1553 by Dee Rutledge RN  Outcome: Progressing  Note: No evidence of new skin breakdown assessed this shift.      Problem: Neurosensory - Adult  Goal: Achieves stable or improved neurological status  Outcome: Progressing  Flowsheets (Taken 11/18/2024 1553)  Achieves stable or improved neurological status: Assess for and report changes in neurological status     Problem: Skin/Tissue Integrity - Adult  Goal: Skin integrity remains intact  Outcome: Progressing  Flowsheets  Taken 11/18/2024 1553 by Dee Rutledge RN  Skin Integrity Remains Intact: Monitor for areas of redness and/or skin breakdown  Taken 11/18/2024 0409 by Rubi Davis RN  Skin Integrity 
  Problem: Discharge Planning  Goal: Discharge to home or other facility with appropriate resources  Outcome: Progressing     Problem: Safety - Adult  Goal: Free from fall injury  Outcome: Progressing  Flowsheets (Taken 11/17/2024 2057)  Free From Fall Injury: Instruct family/caregiver on patient safety     Problem: Pain  Goal: Verbalizes/displays adequate comfort level or baseline comfort level  Outcome: Progressing  Flowsheets (Taken 11/17/2024 2057)  Verbalizes/displays adequate comfort level or baseline comfort level:   Encourage patient to monitor pain and request assistance   Assess pain using appropriate pain scale   Administer analgesics based on type and severity of pain and evaluate response   Implement non-pharmacological measures as appropriate and evaluate response   Consider cultural and social influences on pain and pain management   Notify Licensed Independent Practitioner if interventions unsuccessful or patient reports new pain     Problem: Skin/Tissue Integrity  Goal: Absence of new skin breakdown  Description: 1.  Monitor for areas of redness and/or skin breakdown  2.  Assess vascular access sites hourly  3.  Every 4-6 hours minimum:  Change oxygen saturation probe site  4.  Every 4-6 hours:  If on nasal continuous positive airway pressure, respiratory therapy assess nares and determine need for appliance change or resting period.  Outcome: Progressing  Note: Routine skin assessments, purposeful hourly rounding. Ambulation and up to chair encouraged as tolerated and appropriate. Pt encouraged and assisted to turn at least every 2 hours. Bath and hygiene care assistance offered to pt daily. Nutritional intake encouraged as ordered and appropriate.         Care plan reviewed with patient.  Patient verbalized understanding of the plan of care and contributed to goal setting.    
  Problem: Discharge Planning  Goal: Discharge to home or other facility with appropriate resources  Outcome: Progressing   Care plan reviewed with patient. Patient  verbalized understanding of the plan of care and contribute to goal setting.     
  Problem: Discharge Planning  Goal: Discharge to home or other facility with appropriate resources  Outcome: Progressing  Flowsheets (Taken 11/15/2024 1611)  Discharge to home or other facility with appropriate resources: Identify barriers to discharge with patient and caregiver     Problem: Safety - Adult  Goal: Free from fall injury  Outcome: Progressing  Flowsheets (Taken 11/15/2024 1611)  Free From Fall Injury: Instruct family/caregiver on patient safety     Problem: Pain  Goal: Verbalizes/displays adequate comfort level or baseline comfort level  Outcome: Progressing  Flowsheets (Taken 11/15/2024 1611)  Verbalizes/displays adequate comfort level or baseline comfort level: Assess pain using appropriate pain scale     Problem: Skin/Tissue Integrity  Goal: Absence of new skin breakdown  Description: 1.  Monitor for areas of redness and/or skin breakdown  2.  Assess vascular access sites hourly  3.  Every 4-6 hours minimum:  Change oxygen saturation probe site  4.  Every 4-6 hours:  If on nasal continuous positive airway pressure, respiratory therapy assess nares and determine need for appliance change or resting period.  Outcome: Progressing   Care plan reviewed with patient and sister.  Patient and sister verbalize understanding of the plan of care and contribute to goal setting.     
  Problem: Discharge Planning  Goal: Discharge to home or other facility with appropriate resources  Outcome: Progressing  Flowsheets (Taken 11/16/2024 2129)  Discharge to home or other facility with appropriate resources:   Identify barriers to discharge with patient and caregiver   Arrange for needed discharge resources and transportation as appropriate   Identify discharge learning needs (meds, wound care, etc)   Refer to discharge planning if patient needs post-hospital services based on physician order or complex needs related to functional status, cognitive ability or social support system     Problem: Safety - Adult  Goal: Free from fall injury  Outcome: Progressing  Flowsheets (Taken 11/15/2024 1611 by Shonda Hogue, RN)  Free From Fall Injury: Instruct family/caregiver on patient safety     Problem: Pain  Goal: Verbalizes/displays adequate comfort level or baseline comfort level  Outcome: Progressing  Flowsheets (Taken 11/16/2024 2129)  Verbalizes/displays adequate comfort level or baseline comfort level:   Encourage patient to monitor pain and request assistance   Assess pain using appropriate pain scale   Administer analgesics based on type and severity of pain and evaluate response   Implement non-pharmacological measures as appropriate and evaluate response   Consider cultural and social influences on pain and pain management   Notify Licensed Independent Practitioner if interventions unsuccessful or patient reports new pain     Problem: Skin/Tissue Integrity  Goal: Absence of new skin breakdown  Description: 1.  Monitor for areas of redness and/or skin breakdown  2.  Assess vascular access sites hourly  3.  Every 4-6 hours minimum:  Change oxygen saturation probe site  4.  Every 4-6 hours:  If on nasal continuous positive airway pressure, respiratory therapy assess nares and determine need for appliance change or resting period.  Outcome: Progressing  Note: Routine skin assessments, purposeful 
  Problem: Safety - Adult  Goal: Free from fall injury  Outcome: Progressing     Problem: Pain  Goal: Verbalizes/displays adequate comfort level or baseline comfort level  Outcome: Progressing  Flowsheets (Taken 11/19/2024 0903)  Verbalizes/displays adequate comfort level or baseline comfort level: Encourage patient to monitor pain and request assistance     Problem: Skin/Tissue Integrity  Goal: Absence of new skin breakdown  Description: 1.  Monitor for areas of redness and/or skin breakdown  2.  Assess vascular access sites hourly  3.  Every 4-6 hours minimum:  Change oxygen saturation probe site  4.  Every 4-6 hours:  If on nasal continuous positive airway pressure, respiratory therapy assess nares and determine need for appliance change or resting period.  Outcome: Progressing     Problem: Neurosensory - Adult  Goal: Achieves stable or improved neurological status  Outcome: Progressing  Flowsheets (Taken 11/19/2024 0903)  Achieves stable or improved neurological status: Assess for and report changes in neurological status     Problem: Skin/Tissue Integrity - Adult  Goal: Skin integrity remains intact  Outcome: Progressing  Flowsheets (Taken 11/19/2024 0903)  Skin Integrity Remains Intact: Monitor for areas of redness and/or skin breakdown     Problem: Musculoskeletal - Adult  Goal: Return mobility to safest level of function  Outcome: Progressing  Flowsheets (Taken 11/19/2024 0903)  Return Mobility to Safest Level of Function: Assess patient stability and activity tolerance for standing, transferring and ambulating with or without assistive devices     Problem: Gastrointestinal - Adult  Goal: Maintains or returns to baseline bowel function  Outcome: Progressing  Flowsheets (Taken 11/19/2024 0903)  Maintains or returns to baseline bowel function: Assess bowel function     Problem: Infection - Adult  Goal: Absence of infection at discharge  Outcome: Progressing  Flowsheets (Taken 11/19/2024 0903)  Absence of 
  Problem: Safety - Adult  Goal: Free from fall injury  Outcome: Progressing  Flowsheets (Taken 11/13/2024 0302)  Free From Fall Injury: Instruct family/caregiver on patient safety     Problem: Pain  Goal: Verbalizes/displays adequate comfort level or baseline comfort level  Outcome: Progressing  Flowsheets (Taken 11/13/2024 0302)  Verbalizes/displays adequate comfort level or baseline comfort level:   Assess pain using appropriate pain scale   Encourage patient to monitor pain and request assistance     Problem: Skin/Tissue Integrity  Goal: Absence of new skin breakdown  Description: 1.  Monitor for areas of redness and/or skin breakdown  2.  Assess vascular access sites hourly  3.  Every 4-6 hours minimum:  Change oxygen saturation probe site  4.  Every 4-6 hours:  If on nasal continuous positive airway pressure, respiratory therapy assess nares and determine need for appliance change or resting period.  Outcome: Progressing     
Considering snf  
0903)  Achieves stable or improved neurological status: Assess for and report changes in neurological status     Problem: Skin/Tissue Integrity - Adult  Goal: Skin integrity remains intact  11/20/2024 0100 by Jenelle Avelar RN  Outcome: Progressing  Flowsheets (Taken 11/19/2024 2008)  Skin Integrity Remains Intact: Monitor for areas of redness and/or skin breakdown  11/19/2024 1807 by Yessica Rausch RN  Outcome: Progressing  Flowsheets (Taken 11/19/2024 0903)  Skin Integrity Remains Intact: Monitor for areas of redness and/or skin breakdown     Problem: Musculoskeletal - Adult  Goal: Return mobility to safest level of function  11/20/2024 0100 by Jenelle Avelar RN  Outcome: Progressing  Flowsheets (Taken 11/19/2024 2008)  Return Mobility to Safest Level of Function: Assess patient stability and activity tolerance for standing, transferring and ambulating with or without assistive devices  11/19/2024 1807 by Yessica Rausch RN  Outcome: Progressing  Flowsheets (Taken 11/19/2024 0903)  Return Mobility to Safest Level of Function: Assess patient stability and activity tolerance for standing, transferring and ambulating with or without assistive devices     Problem: Gastrointestinal - Adult  Goal: Maintains or returns to baseline bowel function  11/20/2024 0100 by Jenelle Avelar RN  Outcome: Progressing  Flowsheets (Taken 11/19/2024 2008)  Maintains or returns to baseline bowel function: Assess bowel function  11/19/2024 1807 by Yessica Rausch RN  Outcome: Progressing  Flowsheets (Taken 11/19/2024 0903)  Maintains or returns to baseline bowel function: Assess bowel function     Problem: Infection - Adult  Goal: Absence of infection at discharge  11/20/2024 0100 by Jenelle Avelar RN  Outcome: Progressing  Flowsheets (Taken 11/19/2024 2008)  Absence of infection at discharge: Assess and monitor for signs and symptoms of infection  11/19/2024 1807 by Yessica Rausch RN  Outcome: Progressing  Flowsheets (Taken 11/19/2024

## 2024-11-20 NOTE — CARE COORDINATION
11/20/24, 9:02 AM EST    DISCHARGE PLANNING EVALUATION    Planning [facility] today, transport at 4:00 pm.   Confirmed with [facility] admissions.   Call made to sister, Austyn, who is in agreement with plan.  Discussed with patient, who is in agreement.     11/20/24, 9:07 AM EST    Patient goals/plan/ treatment preferences discussed by  and .  Patient goals/plan/ treatment preferences reviewed with patient/ family.  Patient/ family verbalize understanding of discharge plan and are in agreement with goal/plan/treatment preferences.  Understanding was demonstrated using the teach back method.  AVS provided by RN at time of discharge, which includes all necessary medical information pertaining to the patients current course of illness, treatment, post-discharge goals of care, and treatment preferences.     Services At/After Discharge: Skilled Nursing Facility (SNF) and In ambulance

## 2024-11-20 NOTE — DISCHARGE SUMMARY
Physician Discharge Summary     Patient ID:  Marty Basilio  [id number]  74 y.o.  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 Closed treatment of fx was recommended.  Hospitalist was consulted for medical management of comorbidities.  Patient was started on tylenol and oxycodone for pain control and lovenox for dvt ppx. Patient advanced diet and there were no adverse events on the floor. Patient was found to be stable and suitable for discharge with consulting services. On the day of discharge, patient was afebrile with stable vital signs, stable upon physical exam. Patient was discharged with prescriptions for pain and dvt ppx. Patient was deemed stable for discharge to SNF as recommended by PT/OT. Patient will follow up with Dr Huizar in 2-3 weeks for post-operative visit.    Consults: hospitalist     Discharge Exam:  Please see final progress note for appropriate discharge exam    Disposition: SNF    In process/preliminary results:  Outstanding Order Results       No orders found from 10/14/2024 to 11/13/2024.            Patient Instructions:   Current Discharge Medication List        START taking these medications    Details   cyclobenzaprine (FLEXERIL) 10 MG tablet Take 1 tablet by mouth 3 times daily as needed for Muscle spasms  Qty: 30 tablet, Refills: 0      docusate sodium (COLACE,

## 2024-11-20 NOTE — PROGRESS NOTES
Hospitalist Consult Progress Note      Patient:  Marty Basilio 74 y.o. male      Unit/Bed: -19/019-A  Date of service: 11/15/24      ASSESSMENT AND PLAN    Active Problems  Left proximal femur periprosthetic fracture: X-ray showed intertrochanteric periprosthetic left hip fracture with subtrochanteric extension. Ortho primary. Plan for closed treatment at this time.  Oxycodone as needed for pain control.  PT/OT following. Social work following for placement.  Right lower extremity cellulitis with bilateral venous stasis and lymphadenopathy: History of lymphedema. Doppler showed no evidence of DVT. Continue Keflex at this time. Encourage compression and leg elevation  Peripheral edema, possible HFpEF: Echocardiogram from 11/13/2024 showed EF of 65%, mild concentric hypertrophy, grade 1 diastolic dysfunction. Continue Toprol-XL, Bumex, and losartan. Daily weights and strict I's and O's. 2 L fluid restriction 2 g sodium diet.  Follow-up with Dr. Donaldson outpatient.  Macrocytic anemia: Hemoglobin 10.7 today, initially was 13.1.  MCV elevated at 101. Will order iron studies and B12 and folate levels.  Continue to monitor daily CBC. Transfuse PRBC if hemoglobin less than 7 or hemodynamically stable.  Thrombocytopenia: Plate level 124 today, on admission it was 148.  Will continue to monitor with CBC in the a.m.  Resolved Problems  Hypertensive urgency: Likely secondary to pain as well as not taking blood pressure medications day before admission.  Now resolved.  Continue losartan, Toprol-XL, and Bumex.  Chronic Conditions (reviewed and stable unless otherwise stated)  Primary hypertension: Continue Cozaar 100 mg oral daily, Toprol-XL 25 mg oral daily, Bumex 2 mg oral daily.  Hyperlipidemia: Continue lipid 40 mg oral daily.     DVT prophylaxis: Lovenox per primary  Fluids: N/A per primary  Code Status: Full Code  PT/OT Eval Status: Following    Thank you, Luís Huizar DO, for the consultation.  Hospitalist 
    Hospitalist Consult Progress Note      Patient:  Marty Basilio 74 y.o. male      Unit/Bed: -19/019-A  Date of service: 11/16/24      ASSESSMENT AND PLAN    Active Problems  Left proximal femur periprosthetic fracture: Patient presented after a fall. X-ray showed intertrochanteric periprosthetic left hip fracture with subtrochanteric extension. Ortho primary. Plan for closed treatment at this time. Oxycodone as needed for pain control.  PT/OT following. Social work following for placement.  Right lower extremity cellulitis with bilateral venous stasis and lymphadenopathy: History of lymphedema. Doppler showed no evidence of DVT. Continue Keflex at this time. Encourage compression and leg elevation  Peripheral edema, possible HFpEF: Echocardiogram from 11/13/2024 showed EF of 65%, mild concentric hypertrophy, grade 1 diastolic dysfunction. Continue Toprol-XL, Bumex, and Losartan. Daily weights and strict I's and O's. 2 L fluid restriction 2 g sodium diet. Follow-up with Dr. Donaldson outpatient.  Macrocytic anemia: Hemoglobin 9.5 today, initially was 13.1. MCV elevated at 104.7. Iron studies show iron level 44, TIBC 276, ferritin 128, iron saturation 16%. Folic acid decreased at 4.4 and vitamin B12 level less than 150. Will start oral iron every other day, folic acid daily, and B12 daily. Continue to monitor daily CBC. Transfuse PRBC if hemoglobin less than 7 or hemodynamically stable.  Thrombocytopenia: Platelet level 115 today, on admission it was 148.  Will continue to monitor with CBC in the a.m.  Resolved Problems  Hypertensive urgency: Likely secondary to pain as well as not taking blood pressure medications day before admission. Now resolved.  Continue Losartan, Toprol-XL, and Bumex.  Chronic Conditions (reviewed and stable unless otherwise stated)  Primary hypertension: Continue Cozaar 100 mg oral daily, Toprol-XL 25 mg oral daily, Bumex 2 mg oral daily.  Hyperlipidemia: Continue Lipitor 40 mg oral 
    Hospitalist Consult Progress Note      Patient:  Marty Basilio 74 y.o. male      Unit/Bed: -19/019-A  Date of service: 11/17/24      ASSESSMENT AND PLAN    Active Problems  Left proximal femur periprosthetic fracture: Patient presented after a fall. X-ray showed intertrochanteric periprosthetic left hip fracture with subtrochanteric extension. Ortho primary. Plan for closed treatment at this time. Oxycodone as needed for pain control.  PT/OT following. Social work following for placement.  Right lower extremity cellulitis with bilateral venous stasis and lymphadenopathy: History of lymphedema. Doppler showed no evidence of DVT. Continue Keflex at this time (end date 11/20/24). Encourage compression and leg elevation.  Peripheral edema, possible HFpEF: Echocardiogram from 11/13/2024 showed EF of 65%, mild concentric hypertrophy, grade 1 diastolic dysfunction. Continue Toprol-XL, Bumex, and Losartan. Daily weights and strict I's and O's. 2 L fluid restriction 2 g sodium diet. Follow-up with Dr. Donaldson outpatient.  Macrocytic anemia: Hemoglobin 10.3 today, initially was 13.1. MCV elevated at 104.7. Iron studies show iron level 44, TIBC 276, ferritin 128, iron saturation 16%. Folic acid decreased at 4.4 and vitamin B12 level less than 150. Continue oral iron every other day, folic acid daily, and B12 daily. Continue to monitor daily CBC. Transfuse PRBC if hemoglobin less than 7 or hemodynamically stable.  Thrombocytopenia: Improving. Platelet level 149 today, on admission it was 124.  Will continue to monitor with CBC in the a.m.  Resolved Problems  Hypertensive urgency: Likely secondary to pain as well as not taking blood pressure medications day before admission. Now resolved.  Continue Losartan, Toprol-XL, and Bumex.  Chronic Conditions (reviewed and stable unless otherwise stated)  Primary hypertension: Continue Cozaar 100 mg oral daily, Toprol-XL 25 mg oral daily, Bumex 2 mg oral 
    Low Risk Nutrition Note      Recommendations/Plan:  Continue diet per provider and encourage adequate PO intake at best efforts.  Recommend MVI    Nutrition Assessment:   Admit with L femur fx s/p mechanical fall. Pt seen, states he is eating well and denies any questions or concerns at this time. Denies recent weight changes. States he is going home today. Encouraged pt to continue adequate intake at best efforts.     Reason for Visit:  Initial, LOS    Current Nutrition Therapies:  ADULT DIET; Regular; Low Fat/Low Chol/High Fiber/2 gm Na; 1800 ml    Height:  185.4 cm (6' 1\")    Current Body Weight:  105.3 kg (232 lb 2.3 oz) (11/13: BLE +2 edema)       Diagnosis:  No nutrition diagnosis at this time.    Monitoring and Evaluation:  Patient will be monitored per nutrition standards of care.     Consult Dietitian if nutrition intervention essential to patient care is needed.     Discharge Planning:  No needs      Electronically signed by Yonis Torres RD, LD on 11/20/24 at 12:18 PM EST    Contact:  [phone]         
    Pharmacist Review and Automatic Dose Adjustment of Prophylactic Enoxaparin    Reviewed reason(s) for admission/hospital problem list    The reviewing pharmacist has made an adjustment to the ordered enoxaparin dose or converted to UFH per the approved [facility] protocol and table as identified below.        Marty Basilio is a 74 y.o. male.     Recent Labs     11/12/24  2228   CREATININE 0.8       Estimated Creatinine Clearance: 102 mL/min (based on SCr of 0.8 mg/dL).    Recent Labs     11/12/24  2228   HGB 13.1*   HCT 37.4*        Recent Labs     11/12/24  2228   INR 1.02       Height:   Ht Readings from Last 1 Encounters:   11/13/24 1.854 m (6' 1\")     Weight:  Wt Readings from Last 1 Encounters:   11/13/24 105.3 kg (232 lb 2.3 oz)               Plan: Based upon the patient's weight and renal function    Ordered: Enoxaparin 30mg SUBQ Daily    Changed/converted to    New Order: Enoxaparin 30mg SUBQ BID      Thank you,  Lorenza Noguera [facility]  11/13/2024, 7:36 AM    
 [facility]  INPATIENT PHYSICAL THERAPY  DAILY NOTE  [facility] ORTHOPEDICS 7K - 7K-19/019-A      Discharge Recommendations: Subacte/Skilled Nursing Facility, 24 hour assistance or supervision, Patient would benefit from continued PT at discharge, and Inpatient Therapy Stay  Equipment Recommendations: No (pt has RW and cane)                Time In: 1124  Time Out: 1202  Timed Code Treatment Minutes: 38 Minutes  Minutes: 38          Date: 2024  Patient Name: Marty Basilio,  Gender:  male        MRN: [id number]  : 1950  (74 y.o.)     Referring Practitioner: Shani Mcdermott PA-C  Diagnosis: Periprosthetic fracture around internal prosthetic left hip joint, initial encounter ([facility])  Additional Pertinent Hx: Per EMR:74 y.o. male with a left proximal femur periprosthetic fracture. His imaging was reviewed and we discussed his fracture, its alignment, and different management options for his injury. I recommend closed treatment. The risks, benefits, alternatives to closed treatment of the fracture were discussed at length, specifically nonunion and malunion. Natural history also discussed. He was in agreement to move forward with closed treatment with the understanding of if there is propagation of the fx or if the stem subsides, will proceed with interveniton. Pt underwent -closed treatment L proximal femur periprosthetic fracture  -50% WB LLE with walker  -no active hip abduction, no passive hip adduction beyond neutral     Prior Level of Function:  Lives With: Alone  Type of Home: House  Home Layout: Two level  Home Access: Stairs to enter without rails  Entrance Stairs - Number of Steps: 3 TREVIN without handrails. 12 steps to enter 2nd level with 1 handrail up to 2nd level.  Entrance Stairs - Rails: None  Home Equipment: Cane, Walker - Rolling, Long-handled shoehorn   Bathroom Shower/Tub: Walk-in shower  Bathroom Toilet: Standard  Bathroom Equipment: None (unsure if he still has 
 [facility]  INPATIENT PHYSICAL THERAPY  DAILY NOTE  [facility] ORTHOPEDICS 7K - 7K-19/019-A      Discharge Recommendations: Inpatient Therapy Stay  Equipment Recommendations: No (pt has RW and cane)                 Time In: 1040  Time Out: 1107  Timed Code Treatment Minutes: 27 Minutes  Minutes: 27          Date: 2024  Patient Name: Marty Basilio,  Gender:  male        MRN: [id number]  : 1950  (74 y.o.)     Referring Practitioner: Shani Mcdermott PA-C  Diagnosis: Periprosthetic fracture around internal prosthetic left hip joint, initial encounter ([facility])  Additional Pertinent Hx: Per EMR:74 y.o. male with a left proximal femur periprosthetic fracture. His imaging was reviewed and we discussed his fracture, its alignment, and different management options for his injury. I recommend closed treatment. The risks, benefits, alternatives to closed treatment of the fracture were discussed at length, specifically nonunion and malunion. Natural history also discussed. He was in agreement to move forward with closed treatment with the understanding of if there is propagation of the fx or if the stem subsides, will proceed with interveniton. Pt underwent -closed treatment L proximal femur periprosthetic fracture  -50% WB LLE with walker  -no active hip abduction, no passive hip adduction beyond neutral     Prior Level of Function:  Lives With: Alone  Type of Home: House  Home Layout: Two level  Home Access: Stairs to enter without rails  Entrance Stairs - Number of Steps: 3 TREVIN without handrails. 12 steps to enter 2nd level with 1 handrail up to 2nd level.  Entrance Stairs - Rails: None  Home Equipment: Cane, Walker - Rolling, Long-handled shoehorn   Bathroom Shower/Tub: Walk-in shower  Bathroom Toilet: Standard  Bathroom Equipment: None  Bathroom Accessibility: Accessible    Receives Help From: Family  ADL Assistance: Independent  Homemaking Assistance: Independent  Homemaking 
 [facility]  INPATIENT PHYSICAL THERAPY  DAILY NOTE  [facility] ORTHOPEDICS 7K - 7K-19/019-A      Discharge Recommendations: Subacte/Skilled Nursing Facility  Equipment Recommendations: No (pt has RW and cane)                 Time In: 1330  Time Out: 1359  Timed Code Treatment Minutes: 29 Minutes  Minutes: 29          Date: 2024  Patient Name: Marty Basilio,  Gender:  male        MRN: [id number]  : 1950  (74 y.o.)     Referring Practitioner: Shani Mcdermott PA-C  Diagnosis: Periprosthetic fracture around internal prosthetic left hip joint, initial encounter ([facility])  Additional Pertinent Hx: Per EMR:74 y.o. male with a left proximal femur periprosthetic fracture. His imaging was reviewed and we discussed his fracture, its alignment, and different management options for his injury. I recommend closed treatment. The risks, benefits, alternatives to closed treatment of the fracture were discussed at length, specifically nonunion and malunion. Natural history also discussed. He was in agreement to move forward with closed treatment with the understanding of if there is propagation of the fx or if the stem subsides, will proceed with interveniton. Pt underwent -closed treatment L proximal femur periprosthetic fracture  -50% WB LLE with walker  -no active hip abduction, no passive hip adduction beyond neutral     Prior Level of Function:  Lives With: Alone  Type of Home: House  Home Layout: Two level  Home Access: Stairs to enter without rails  Entrance Stairs - Number of Steps: 3 TREVIN without handrails. 12 steps to enter 2nd level with 1 handrail up to 2nd level.  Entrance Stairs - Rails: None  Home Equipment: Cane, Walker - Rolling, Long-handled shoehorn   Bathroom Shower/Tub: Walk-in shower  Bathroom Toilet: Standard  Bathroom Equipment: None  Bathroom Accessibility: Accessible    Receives Help From: Family  ADL Assistance: Independent  Homemaking Assistance: Independent  Homemaking 
 [facility]  INPATIENT PHYSICAL THERAPY  DAILY NOTE  [facility] ORTHOPEDICS 7K - 7K-19/019-A      Discharge Recommendations: Subacte/Skilled Nursing Facility  Equipment Recommendations: No (pt has RW and cane)               Time In: 1033  Time Out: 1057  Timed Code Treatment Minutes: 24 Minutes  Minutes: 24          Date: 11/15/2024  Patient Name: Marty Basilio,  Gender:  male        MRN: [id number]  : 1950  (74 y.o.)     Referring Practitioner: Shani Mcdermott PA-C  Diagnosis: Periprosthetic fracture around internal prosthetic left hip joint, initial encounter ([facility])  Additional Pertinent Hx: Per EMR:74 y.o. male with a left proximal femur periprosthetic fracture. His imaging was reviewed and we discussed his fracture, its alignment, and different management options for his injury. I recommend closed treatment. The risks, benefits, alternatives to closed treatment of the fracture were discussed at length, specifically nonunion and malunion. Natural history also discussed. He was in agreement to move forward with closed treatment with the understanding of if there is propagation of the fx or if the stem subsides, will proceed with interveniton. Pt underwent -closed treatment L proximal femur periprosthetic fracture  -50% WB LLE with walker  -no active hip abduction, no passive hip adduction beyond neutral     Prior Level of Function:  Lives With: Alone  Type of Home: House  Home Layout: Two level  Home Access: Stairs to enter without rails  Entrance Stairs - Number of Steps: 3 TREVIN without handrails. 12 steps to enter 2nd level with 1 handrail up to 2nd level.  Entrance Stairs - Rails: None  Home Equipment: Cane, Walker - Rolling, Long-handled shoehorn   Bathroom Shower/Tub: Walk-in shower  Bathroom Toilet: Standard  Bathroom Equipment: None (unsure if he still has the elevated toilet seat)    ADL Assistance: Independent  Homemaking Assistance: Independent  Homemaking 
[facility]  INPATIENT PHYSICAL THERAPY  EVALUATION  [facility] ORTHOPEDICS 7K - 7K-19/019-A    Discharge Recommendations: Continue to assess pending progress (Pt unable to return home at this time, not tolerating mobility at this time due to pain. Pt unable to tolerate sitting EOB due to increased Left Hip pain.)  Equipment Recommendations: No (pt has RW and cane)           Time In: 1029  Time Out: 1111  Timed Code Treatment Minutes: 23 Minutes  Minutes: 42        Date: 2024  Patient Name: Marty Basilio,  Gender:  male        MRN: [id number]  : 1950  (74 y.o.)      Referring Practitioner: Shani Mcdermott PA-C  Diagnosis: Periprosthetic fracture around internal prosthetic left hip joint, initial encounter ([facility])  Additional Pertinent Hx: Per EMR:74 y.o. male with a left proximal femur periprosthetic fracture. His imaging was reviewed and we discussed his fracture, its alignment, and different management options for his injury. I recommend closed treatment. The risks, benefits, alternatives to closed treatment of the fracture were discussed at length, specifically nonunion and malunion. Natural history also discussed. He was in agreement to move forward with closed treatment with the understanding of if there is propagation of the fx or if the stem subsides, will proceed with interveniton. Pt underwent -closed treatment L proximal femur periprosthetic fracture  -50% WB LLE with walker  -no active hip abduction, no passive hip adduction beyond neutral     Restrictions/Precautions:  Restrictions/Precautions: ROM Restrictions, Weight Bearing  Left Lower Extremity Weight Bearing: Partial Weight Bearing (50% WB)  Position Activity Restriction  Hip Precautions: No active ABduction, No ADduction  Other position/activity restrictions: -closed treatment L proximal femur periprosthetic fracture  -50% WB LLE with walker  -no active hip abduction, no passive hip adduction beyond 
Occupational Therapy  [facility]  OCCUPATIONAL THERAPY MISSED TREATMENT NOTE  [facility] ORTHOPEDICS 7K  7K-19/019-A      Date: 2024  Patient Name: Marty Basilio        CSN: [id number]   : 1950  (74 y.o.)  Gender: male            REASON FOR MISSED TREATMENT:  Pt can be heard yelling out in pain from hallway. This therapist entered room to check on pt. Pt reports he is having a muscle cramp. Pt agreed for therapy to check back tomorrow morning.                
Orthopaedic Progress Note      SUBJECTIVE   Mr. Basilio is hospital day 3, L proximal femur periprosthetic fracture    Seen at bedside this morning  Continued efforts with therapy  Pain well controlled, tolerating oral diet  Denies any numbness/paresthesia to LLE  No real concerns this morning      OBJECTIVE      Physical    VITALS:  BP (!) 151/70   Pulse 56   Temp 98.1 °F (36.7 °C) (Oral)   Resp 16   Ht 1.854 m (6' 1\")   Wt 105.3 kg (232 lb 2.3 oz)   SpO2 93%   BMI 30.63 kg/m²     6/10 pain  Gen: alert and oriented  Head: normorcephalic, atraumatic  Resp: unlabored, room air  Pelvis: stable  LLE:- atraumatic hip, knee, ankle, no lacerations or lesions.venous stasis skin changes distally. Prior incision cdi. Sitting in neutral alignment. Compartments soft.  Nontender to palpation asis iliac crest, TTP greater troch, nontender groin femoral shaft medial lateral joint line, tibia shaft, medial lateral mal, midfoot, calc, calf supple nontender,  Able to perform SLR, gastroc ta ehl intact, some pain with  IR ER through hip. sensation to light touch intact, distal pulses palpable       Data  CBC:   Lab Results   Component Value Date/Time    WBC 7.7 11/12/2024 10:28 PM    HGB 13.1 11/12/2024 10:28 PM     11/12/2024 10:28 PM     BMP:    Lab Results   Component Value Date/Time     11/12/2024 10:28 PM    K 3.8 11/12/2024 10:28 PM     11/12/2024 10:28 PM    CO2 26 11/12/2024 10:28 PM    BUN 18 11/12/2024 10:28 PM    CREATININE 0.8 11/12/2024 10:28 PM    CALCIUM 8.6 11/12/2024 10:28 PM    GLUCOSE 114 11/12/2024 10:28 PM     Uric Acid:  No components found for: \"URIC\"  PT/INR:    Lab Results   Component Value Date/Time    INR 1.02 11/12/2024 10:28 PM     Troponin:  No results found for: \"TROPONINI\"  Urine Culture:  No components found for: \"CURINE\"      Current Inpatient Medications    Current Facility-Administered Medications: oxyCODONE (ROXICODONE) immediate release tablet 5 mg, 5 mg, Oral, Q4H 
Orthopaedic Progress Note      SUBJECTIVE   Mr. Basilio is hospital stay day # 4     Patient currently being treated non-operatively for left proximal periprosthetic femur fracture patient seen resting in bed.  Pain adequately controlled.  Awaiting final decision for ECF placement, case management assisting.  No new orthopedic concerns.  No acute events overnight.      OBJECTIVE      Physical    VITALS:  BP (!) 141/63   Pulse 64   Temp 98.1 °F (36.7 °C) (Oral)   Resp 18   Ht 1.854 m (6' 1\")   Wt 105.3 kg (232 lb 2.3 oz)   SpO2 94%   BMI 30.63 kg/m²   I/O last 3 completed shifts:  In: 1390 [P.O.:1390]  Out: 2850 [Urine:2850]      LLE: No obvious wounds noted.  Still demonstrating venous stasis skin changes distally.  No significant change in orientation/alignment of the left lower extremity.  Compartment soft and nontender.  Distal pulses palpable.  No focal neurodeficits.  Sensation light touch intact.      Data  CBC:   Lab Results   Component Value Date/Time    WBC 3.6 11/16/2024 06:05 AM    HGB 9.5 11/16/2024 06:05 AM     11/16/2024 06:05 AM     BMP:    Lab Results   Component Value Date/Time     11/16/2024 06:05 AM    K 3.9 11/16/2024 06:05 AM     11/16/2024 06:05 AM    CO2 28 11/16/2024 06:05 AM    BUN 22 11/16/2024 06:05 AM    CREATININE 1.0 11/16/2024 06:05 AM    CALCIUM 8.8 11/16/2024 06:05 AM    GLUCOSE 120 11/16/2024 06:05 AM     Uric Acid:  No components found for: \"URIC\"  PT/INR:    Lab Results   Component Value Date/Time    INR 1.02 11/12/2024 10:28 PM     Troponin:  No results found for: \"TROPONINI\"  Urine Culture:  No components found for: \"CURINE\"      Current Inpatient Medications    Current Facility-Administered Medications: ondansetron (ZOFRAN-ODT) disintegrating tablet 4 mg, 4 mg, Oral, Q8H PRN  oxyCODONE (ROXICODONE) immediate release tablet 5 mg, 5 mg, Oral, Q4H PRN **OR** oxyCODONE (ROXICODONE) immediate release tablet 10 mg, 10 mg, Oral, Q4H PRN  atorvastatin 
Orthopaedic Progress Note      SUBJECTIVE   Mr. Basilio is hospital stay day # 6     Patient currently being treated non-operatively for left proximal periprosthetic femur fracture patient seen resting in bed.    Pain adequately controlled.   Confusion and some aggression overnight per nursing, stable on exam this morning   Advanced diet  Good effort therapy     OBJECTIVE      Physical    VITALS:  BP (!) 157/69   Pulse 60   Temp 97.9 °F (36.6 °C) (Oral)   Resp 17   Ht 1.854 m (6' 1\")   Wt 105.3 kg (232 lb 2.3 oz)   SpO2 97%   BMI 30.63 kg/m²   I/O last 3 completed shifts:  In: 2310 [P.O.:2310]  Out: 2600 [Urine:2600]    5/10 pain  Gen: alert and oriented to name  injury location and plan of care  Head: normorcephalic, atraumatic  Resp: unlabored, room air  Pelvis: stable  LLE: No obvious wounds noted.  Still demonstrating venous stasis skin changes distally.  No significant change in orientation/alignment of the left lower extremity.  Compartment soft and nontender.  Distal pulses palpable.  No focal neurodeficits.  Sensation light touch intact.       Data  CBC:   Lab Results   Component Value Date/Time    WBC 3.9 2024 06:48 AM    HGB 10.3 2024 06:48 AM     2024 06:48 AM     BMP:    Lab Results   Component Value Date/Time     2024 06:48 AM    K 4.0 2024 06:48 AM     2024 06:48 AM    CO2 27 2024 06:48 AM    BUN 27 2024 06:48 AM    CREATININE 1.0 2024 06:48 AM    CALCIUM 9.0 2024 06:48 AM    GLUCOSE 107 2024 06:48 AM     Uric Acid:  No components found for: \"URIC\"  PT/INR:    Lab Results   Component Value Date/Time    INR 1.02 2024 10:28 PM     Troponin:  No results found for: \"TROPONINI\"  Urine Culture:  No components found for: \"CURINE\"      Current Inpatient Medications    Current Facility-Administered Medications: acetaminophen (TYLENOL) tablet 1,000 mg, 1,000 mg, Oral, Q6H PRN  lidocaine 4 % external patch 3 
Orthopaedic Progress Note      SUBJECTIVE   Mr. Basilio is hospital stay day # 8    Patient currently being treated non-operatively for left proximal periprosthetic femur fracture patient seen resting in bed.    Pain adequately controlled.   Confusion resolution, stable and appropriate on exam today   Advanced diet  Good effort therapy   Talked with family via phone yesterday    OBJECTIVE      Physical    VITALS:  /68   Pulse 62   Temp 98.2 °F (36.8 °C) (Oral)   Resp 18   Ht 1.854 m (6' 1\")   Wt 105.3 kg (232 lb 2.3 oz)   SpO2 99%   BMI 30.63 kg/m²   I/O last 3 completed shifts:  In: 1420 [P.O.:1420]  Out: 400 [Urine:400]    4/10 pain  Gen: alert and oriented to name  injury location and plan of care, able to relay to me plan of care  Head: normorcephalic, atraumatic  Resp: unlabored, room air  Pelvis: stable  LLE: No obvious wounds noted.  Still demonstrating venous stasis skin changes distally.  No significant change in orientation/alignment of the left lower extremity.  Compartment soft and nontender.  Distal pulses palpable.  No focal neurodeficits.  Sensation light touch intact.       Data  CBC:   Lab Results   Component Value Date/Time    WBC 5.0 2024 06:57 AM    HGB 11.1 2024 06:57 AM     2024 06:57 AM     BMP:    Lab Results   Component Value Date/Time     2024 06:57 AM    K 3.5 2024 06:57 AM     2024 06:57 AM    CO2 25 2024 06:57 AM    BUN 29 2024 06:57 AM    CREATININE 1.1 2024 06:57 AM    CALCIUM 9.3 2024 06:57 AM    GLUCOSE 118 2024 06:57 AM     Uric Acid:  No components found for: \"URIC\"  PT/INR:    Lab Results   Component Value Date/Time    INR 1.02 2024 10:28 PM     Troponin:  No results found for: \"TROPONINI\"  Urine Culture:  No components found for: \"CURINE\"      Current Inpatient Medications    Current Facility-Administered Medications: acetaminophen (TYLENOL) tablet 1,000 mg, 1,000 mg, 
Patient alert and oriented. Remains calm and cooperative this morning. Virtual  discontinued at this time.  
Patients wallet, keys, cigarrettes, and home medications locked up in room. Family and patient aware. Everything labeled with his name and in plastic grocery bag.   
Pt admitted to  7K19 via cart/stretcher.     Complaints: Fall Periprosthetic Left hip fx .      IV none infusing into the forearm left, condition patent and no redness at a rate of 0 mls/ hour with about 0 mls in the bag still. IV site free of s/s of infection or infiltration. Vital signs obtained. Assessment and data collection initiated.     Two nurse skin assessment performed by Salo CORBETT and Rubi CORBETT. Oriented to room.     Explained patients right to have family, representative or physician notified of their admission.  Patient has Declined for physician to be notified.  Patient has Declined for family/representative to be notified.    The patient is interested in [facility]’s meds to beds program?:  Yes    Policies and procedures for  explained. All questions answered with no further questions at this time. Fall prevention and safety brochure discussed with patient.  Bed alarm on. Call light in reach.       
Pt transported by stretcher two attended. Report called to nurse receiving pt at facility. No complaints or signs of distress noted at time of d/c.   
SBIRT screening completed per trauma protocol. SBIRT Findings are Negative.  Patient denies alcohol and/or drug use. Also denies depressive symptoms.    Brief Intervention and Referral to Treatment Summary N/A    Patient was provided PHQ-9, AUDIT-C and DAST Screening:      PHQ-9 Score:  Negative  AUDIT-C Score:  Negative  DAST Score:   Negative    Patient’s substance use is considered-Negative    Low Risk/Healthy  Moderate Risk  Harmful  Dependent    Patient’s depression is considered:-Negative    Minimal  Mild   Moderate  Moderately Severe  Severe    Brief Education Was Provided N/A    Patient was receptive  Patient was not receptive      Brief Intervention Is Provided (Only for AUDIT-C or DAST) N/A    Patient reports readiness to decrease and/or stop use and a plan was discussed   Patient denies readiness to decrease and/or stop use and a plan was not discussed    Injured Trauma Survivor Screening  1.  When you were injured or right afterward   Did you think you were going to die? RESPONSES; YES+1/NO: NO  Do you think this was done to you intentionally? NO    Since your injury  Have you felt more restless, tense or jumpy than usual? RESPONSES; YES+1/NO: NO  Have you found yourself unable to stop worrying? RESPONSES; YES+1/NO: NO  Do you find yourself thinking that the world is unsafe and that people are not to be trusted? RESPONSES; YES+1/NO: NO    TOTAL SCORE from ITSS Questions 1 and 2:   0  NOTE: A score of greater than or equal to 2 is considered positive for PTSD risk and is to receive a community resource packet to link with appropriate providers.    Recommendations/Referrals for Brief and/or Specialized Treatment Provided to Patient  N/A      
TRAUMA SBIRT attempt. Patient currently sleeping at this time. CANDELARIA SW to attempt at a later time  
[facility] ORTHOPEDICS 7K  Occupational Therapy  Daily Note    Discharge Recommendations: Subacute/skilled nursing facility  Equipment Recommendations: Yes bedside commode       Time In: 0933  Time Out: 1012  Timed Code Treatment Minutes: 39 Minutes  Minutes: 39          Date: 2024  Patient Name: Marty Basilio,   Gender: male      [facility]  MRN: [id number]  : 1950  (74 y.o.)  Referring Practitioner: Shani Mcdermott PA-C  Diagnosis: Periprosthetic fracture around internal prosthetic left hip joint, initial encounter ([facility])  Additional Pertinent Hx: Pt with a left proximal femur periprosthetic fracture. His imaging was reviewed and we discussed his fracture, its alignment, and different management options for his injury. I recommend closed treatment. The risks, benefits, alternatives to closed treatment of the fracture were discussed at length, specifically nonunion and malunion. Natural history also discussed. He was in agreement to move forward with closed treatment with the understanding of if there is propagation of the fx or if the stem subsides, will proceed with interveniton. Pt underwent -closed treatment L proximal femur periprosthetic fracture  -50% WB LLE with walker  -no active hip abduction, no passive hip adduction beyond neutral.    Restrictions/Precautions:  Restrictions/Precautions: ROM Restrictions, Weight Bearing  Left Lower Extremity Weight Bearing: Partial Weight Bearing  Position Activity Restriction  Hip Precautions: No active ABduction, No ADduction  Other position/activity restrictions: -closed treatment L proximal femur periprosthetic fracture  -50% WB LLE with walker  -no active hip abduction, no passive hip adduction beyond neutral      Social/Functional History:  Lives With: Alone  Type of Home: House  Home Layout: Two level  Home Access: Stairs to enter without rails  Entrance Stairs - Number of Steps: 3 TREVIN without handrails. 12 
[facility]  OCCUPATIONAL THERAPY MISSED TREATMENT NOTE  [facility] ORTHOPEDICS 7K  7K-19/019-A      Date: 2024  Patient Name: Marty Basilio        CSN: [id number]   : 1950  (74 y.o.)  Gender: male   Referring Practitioner: Shani Mcdermott PA-C  Diagnosis: Periprosthetic Fracture around internal prosthetic left hip joint         REASON FOR MISSED TREATMENT: Patient Refused.  Pt wanting to watch the football game at this time             
[facility]  OCCUPATIONAL THERAPY MISSED TREATMENT NOTE  [facility] [[email or web address] ORTHOPEDICS 7K  7K-19/019-A      Date: 2024  Patient Name: Marty Basilio        CSN: [id number]   : 1950  (74 y.o.)  Gender: male                REASON FOR MISSED TREATMENT:  Patient completed PT this AM and Pt not tolerating therapy well. OT will hold until 24.                 
(LIPITOR) tablet 40 mg, 40 mg, Oral, Daily  bumetanide (BUMEX) tablet 2 mg, 2 mg, Oral, Daily  metoprolol succinate (TOPROL XL) extended release tablet 25 mg, 25 mg, Oral, Daily  nitroGLYCERIN (NITROSTAT) SL tablet 0.4 mg, 0.4 mg, SubLINGual, Q5 Min PRN  potassium chloride (KLOR-CON) extended release tablet 10 mEq, 10 mEq, Oral, Daily  oyster shell calcium w/D 500-5 MG-MCG tablet 1 tablet, 1 tablet, Oral, BID  losartan (COZAAR) tablet 100 mg, 100 mg, Oral, Daily  morphine (PF) injection 2 mg, 2 mg, IntraVENous, Q3H PRN  HYDROcodone-acetaminophen (NORCO) 5-325 MG per tablet 1 tablet, 1 tablet, Oral, Q6H PRN  cephALEXin (KEFLEX) capsule 500 mg, 500 mg, Oral, 4 times per day  hydrALAZINE (APRESOLINE) injection 5 mg, 5 mg, IntraVENous, Q6H PRN  cyclobenzaprine (FLEXERIL) tablet 10 mg, 10 mg, Oral, TID PRN  docusate sodium (COLACE) capsule 100 mg, 100 mg, Oral, Daily  magnesium hydroxide (MILK OF MAGNESIA) 400 MG/5ML suspension 30 mL, 30 mL, Oral, Daily PRN  enoxaparin Sodium (LOVENOX) injection 30 mg, 30 mg, SubCUTAneous, BID        PLAN    Mr. Basilio is hospital day 2, L proximal femur periprosthetic fracture    Closed treatment  Continue 50% WB LLE with greater troch precautions  Multimodal pain control, will modify  Bowel regimen  Continue efforts with therapy  Dispo- SNF likely   
1,000 mcg, 1,000 mcg, Oral, Daily  ondansetron (ZOFRAN-ODT) disintegrating tablet 4 mg, 4 mg, Oral, Q8H PRN  oxyCODONE (ROXICODONE) immediate release tablet 5 mg, 5 mg, Oral, Q4H PRN **OR** oxyCODONE (ROXICODONE) immediate release tablet 10 mg, 10 mg, Oral, Q4H PRN  atorvastatin (LIPITOR) tablet 40 mg, 40 mg, Oral, Daily  bumetanide (BUMEX) tablet 2 mg, 2 mg, Oral, Daily  metoprolol succinate (TOPROL XL) extended release tablet 25 mg, 25 mg, Oral, Daily  nitroGLYCERIN (NITROSTAT) SL tablet 0.4 mg, 0.4 mg, SubLINGual, Q5 Min PRN  potassium chloride (KLOR-CON) extended release tablet 10 mEq, 10 mEq, Oral, Daily  oyster shell calcium w/D 500-5 MG-MCG tablet 1 tablet, 1 tablet, Oral, BID  losartan (COZAAR) tablet 100 mg, 100 mg, Oral, Daily  morphine (PF) injection 2 mg, 2 mg, IntraVENous, Q3H PRN  cephALEXin (KEFLEX) capsule 500 mg, 500 mg, Oral, 4 times per day  hydrALAZINE (APRESOLINE) injection 5 mg, 5 mg, IntraVENous, Q6H PRN  cyclobenzaprine (FLEXERIL) tablet 10 mg, 10 mg, Oral, TID PRN  docusate sodium (COLACE) capsule 100 mg, 100 mg, Oral, Daily  enoxaparin Sodium (LOVENOX) injection 30 mg, 30 mg, SubCUTAneous, BID        PLAN    1) continue with current medical management  2) 50% weightbearing left lower extremity, activity as tolerated.  Avoid active abduction of the left hip  3) PT/OT  4) we will continue with conservative treatment of the left periprosthetic femur fracture  5) awaiting final disposition, case management assisting with discharge needs, will hopefully know more tomorrow     Ciro Ryan PA-C  
1,000 mg, 1,000 mg, Oral, Q6H PRN  lidocaine 4 % external patch 3 patch, 3 patch, TransDERmal, Daily  losartan (COZAAR) tablet 100 mg, 100 mg, Oral, Nightly  senna (SENOKOT) tablet 8.6 mg, 1 tablet, Oral, BID  magnesium hydroxide (MILK OF MAGNESIA) 400 MG/5ML suspension 30 mL, 30 mL, Oral, Daily  ferrous sulfate (IRON 325) tablet 325 mg, 325 mg, Oral, Every Other Day  folic acid (FOLVITE) tablet 1 mg, 1 mg, Oral, Daily  vitamin B-12 (CYANOCOBALAMIN) tablet 1,000 mcg, 1,000 mcg, Oral, Daily  ondansetron (ZOFRAN-ODT) disintegrating tablet 4 mg, 4 mg, Oral, Q8H PRN  oxyCODONE (ROXICODONE) immediate release tablet 5 mg, 5 mg, Oral, Q4H PRN **OR** oxyCODONE (ROXICODONE) immediate release tablet 10 mg, 10 mg, Oral, Q4H PRN  atorvastatin (LIPITOR) tablet 40 mg, 40 mg, Oral, Daily  bumetanide (BUMEX) tablet 2 mg, 2 mg, Oral, Daily  metoprolol succinate (TOPROL XL) extended release tablet 25 mg, 25 mg, Oral, Daily  nitroGLYCERIN (NITROSTAT) SL tablet 0.4 mg, 0.4 mg, SubLINGual, Q5 Min PRN  potassium chloride (KLOR-CON) extended release tablet 10 mEq, 10 mEq, Oral, Daily  oyster shell calcium w/D 500-5 MG-MCG tablet 1 tablet, 1 tablet, Oral, BID  cephALEXin (KEFLEX) capsule 500 mg, 500 mg, Oral, 4 times per day  hydrALAZINE (APRESOLINE) injection 5 mg, 5 mg, IntraVENous, Q6H PRN  cyclobenzaprine (FLEXERIL) tablet 10 mg, 10 mg, Oral, TID PRN  docusate sodium (COLACE) capsule 100 mg, 100 mg, Oral, Daily  enoxaparin Sodium (LOVENOX) injection 30 mg, 30 mg, SubCUTAneous, BID        PLAN  Closed treatment L proximal periprosthetic femur fracture    1) continue with current medical management  2) 50% weightbearing left lower extremity, activity as tolerated.  Avoid active abduction of the left hip  3) PT/OT  4) we will continue with conservative treatment of the left periprosthetic femur fracture  5) SNF, medically stable , pre cert pending    6) lovenox        
in AM prior to morning meds given  Thrombocytopenia- resolved: Will continue to monitor with CBC in the a.m.  Chronic Conditions (reviewed and stable unless otherwise stated)  Hyperlipidemia: Continue Lipitor 40 mg oral daily.     DVT prophylaxis: Lovenox per primary  Fluids: N/A per primary  Code Status: Full Code  PT/OT Eval Status: Following    Thank you, Luís Huizar DO, for the consultation. Hospitalist service will continue to follow along.      ===================================================================    Chief Complaint/Reason for Consult: Hip pain, medical management    Hospital Course:   Per initial consult note \"Marty Basilio74 y.o. male who we are asked to see/evaluate by Luís Huizar DO for medical management of CAD, hypertension. Patient reports walking up to stairs to get from his garage into his house. Reports that he was utilizing his cane in 1 hand and carrying a bag of food and the other. When he went to step over the threshold, he states that he lost his balance and fell backwards landing against a chayo basket and onto his left hip. Reports that he was unable to stand up and had to crawl out of the garage and call for help. Patient reports mechanical fall only. Denies any lightheadedness, chest pain, shortness of breath prior to or since the event. In ER, patient found to have left hip fracture. Admitted under orthopedic service. Upon exam, patient is resting in bed. Noted to be hypertensive. States he missed a couple of his blood pressure medications yesterday. Reports history of lymphedema. Noted to have significant erythema, warmth, swelling to right calf compared to left. States that approximately week ago he hit his leg into outdoor equipment and has had issues with increased swelling and erythema since then. Noted to have scab/healing wound to lower right extremity.\"     Subjective/24 hours:   Patient up in chair.  Discussed with nursing staff.  No behavioral 
currently alert and oriented x 4.  States pain is controlled.  States he has been sleeping well.  No other complaints at this time    Exam:  BP (!) 170/74   Pulse 63   Temp 98 °F (36.7 °C) (Oral)   Resp 18   Ht 1.854 m (6' 1\")   Wt 105.3 kg (232 lb 2.3 oz)   SpO2 94%   BMI 30.63 kg/m²   General appearance: No apparent distress, appears stated age.  Eyes:  Pupils equal, round, and reactive to light. Conjunctivae/corneas clear.  HENT: Head normal in appearance. External nares normal.  Oral mucosa moist without lesions.  Hearing grossly intact.   Neck: Supple, with full range of motion. Trachea midline.  No gross JVD appreciated.  Respiratory:  Normal respiratory effort. Clear to auscultation, bilaterally without rales or wheezes or rhonchi.  Cardiovascular: Normal rate, regular rhythm with normal S1/S2 without murmurs.  No lower extremity edema.   Abdomen: Soft, non-tender, non-distended with normal bowel sounds. No guarding.  Musculoskeletal: Left hip decreased ROM due to pain.   Skin: Warm and dry. Venous stasis changes bilateral shins. Previous erythema and warmth have resolved.   Neurologic:  No focal sensory/motor deficits in the upper and lower extremities. Cranial nerves:  grossly non-focal 2-12.     Psychiatric: Alert and oriented, normal insight and thought content.   Capillary Refill: Brisk,< 3 seconds.  Peripheral Pulses: +2 palpable, equal bilaterally.     Labs: see chart  Radiology: see chart or assessment above.       Electronically signed by Nhung Muller PA-C on 11/18/2024 at 10:34 AM   
impulsive d/t pain  Standing Balance: Minimal Assistance, X 1, with cues for safety, with verbal cues , with increased time for completion. Within georgia stedy, x ~40sec x 2 trials with verbal cues to maintain PWB    BED MOBILITY:  Supine to Sit: Moderate Assistance, X 1, with head of bed raised, with rail, with verbal cues , with increased time for completion      TRANSFERS:  Sit to Stand:  Moderate Assistance, X 1, with increased time for completion, cues for hand placement, with verbal cues. With georgia stedy.  Trialed sit to stand with RW initially EOB to RW x 3 attempts however patient unable to clear bottom, impulsive d/t pain, not safe to continue, transitioned to georgia stedy.  Stand to Sit: Moderate Assistance, X 1, with [place] Stedy, with increased time for completion, cues for hand placement, with verbal cues, to/from chair with arms, poor eccentric control, cues for alignment to surface and for safety with assistive device.      FUNCTIONAL MOBILITY:  Assistive Device: georgia stedy  Assist Level:  Dependent.   Distance:  EOB to BSC, BSC to bedside chair      ADDITIONAL ACTIVITIES:    AM-PAC Inpatient Daily Activity Raw Score: 16  AM-PAC Inpatient ADL T-Scale Score : 35.96  ADL Inpatient CMS 0-100% Score: 53.32    ASSESSMENT:     Activity Tolerance:  Patient tolerance of  treatment: Good treatment tolerance      Plan: Times Per Week: 6x  Specific Instructions for Next Treatment: Functional transfers; ADLs and dynamic sitting balance; UE ROM and moderate resistance exercises  Current Treatment Recommendations: Strengthening, Balance training, Functional mobility training, Endurance training, Self-Care / ADL, Pain management, Patient/Caregiver education & training  Additional Comments: Pt would benefit from continued skilled OT services when medically stable and discharged from Acute. OT recommended while at SNF.    Education:  Learners: Patient  Role of OT, Plan of Care, ADL's, IADL's, Precautions, Orientation, Home 
complete BUE ROM/moderate resistance exercises with verbal cues for taking slow breaths to increase his endurance and strength for ease of doing ADLs and functional mobility.  Short Term Goal 4: Pt will complete all bed mobility while following relaxed breathing with Min cues to increase his pain management for ease of doing self care and functional transfers.  Additional Goals?: No    AM-PAC Inpatient Daily Activity Raw Score: 14  AM-PAC Inpatient ADL T-Scale Score : 33.39    Following session, patient left in safe position with all fall risk precautions in place.